# Patient Record
Sex: MALE | Race: WHITE | Employment: OTHER | URBAN - METROPOLITAN AREA
[De-identification: names, ages, dates, MRNs, and addresses within clinical notes are randomized per-mention and may not be internally consistent; named-entity substitution may affect disease eponyms.]

---

## 2023-05-24 ENCOUNTER — TRANSCRIBE ORDERS (OUTPATIENT)
Dept: ADMINISTRATIVE | Age: 68
End: 2023-05-24

## 2023-05-24 DIAGNOSIS — R31.0 GROSS HEMATURIA: Primary | ICD-10-CM

## 2023-10-02 ENCOUNTER — HOSPITAL ENCOUNTER (OUTPATIENT)
Dept: CT IMAGING | Age: 68
Discharge: HOME | End: 2023-10-02
Payer: MEDICARE

## 2023-10-02 DIAGNOSIS — C67.9 MALIGNANT NEOPLASM OF URINARY BLADDER, UNSPECIFIED SITE (HCC): ICD-10-CM

## 2023-10-02 DIAGNOSIS — N32.9 BLADDER DISORDER, UNSPECIFIED: ICD-10-CM

## 2023-10-02 DIAGNOSIS — R89.6: ICD-10-CM

## 2023-10-02 DIAGNOSIS — R31.0 GROSS HEMATURIA: ICD-10-CM

## 2023-10-02 LAB
BUN SERPL-MCNC: 15 MG/DL (ref 7–20)
CREAT SERPL-MCNC: 1 MG/DL (ref 0.8–1.3)
GFR SERPLBLD CREATININE-BSD FMLA CKD-EPI: >60 ML/MIN/{1.73_M2}

## 2023-10-02 PROCEDURE — 84520 ASSAY OF UREA NITROGEN: CPT

## 2023-10-02 PROCEDURE — 82565 ASSAY OF CREATININE: CPT

## 2023-10-02 PROCEDURE — 36415 COLL VENOUS BLD VENIPUNCTURE: CPT

## 2023-10-02 PROCEDURE — 71260 CT THORAX DX C+: CPT

## 2023-10-02 PROCEDURE — 6360000004 HC RX CONTRAST MEDICATION: Performed by: UROLOGY

## 2023-10-02 RX ADMIN — IOPAMIDOL 75 ML: 755 INJECTION, SOLUTION INTRAVENOUS at 17:16

## 2024-01-17 ENCOUNTER — HOSPITAL ENCOUNTER (OUTPATIENT)
Dept: CT IMAGING | Age: 69
Discharge: HOME | End: 2024-01-17
Attending: UROLOGY
Payer: MEDICARE

## 2024-01-17 DIAGNOSIS — C67.9 MALIGNANT NEOPLASM OF URINARY BLADDER, UNSPECIFIED SITE (HCC): ICD-10-CM

## 2024-01-17 LAB
BUN SERPL-MCNC: 19 MG/DL (ref 7–20)
CREAT SERPL-MCNC: 1.4 MG/DL (ref 0.8–1.3)
GFR SERPLBLD CREATININE-BSD FMLA CKD-EPI: 55 ML/MIN/{1.73_M2}

## 2024-01-17 PROCEDURE — 36415 COLL VENOUS BLD VENIPUNCTURE: CPT

## 2024-01-17 PROCEDURE — 82565 ASSAY OF CREATININE: CPT

## 2024-01-17 PROCEDURE — 84520 ASSAY OF UREA NITROGEN: CPT

## 2024-01-17 PROCEDURE — 6360000004 HC RX CONTRAST MEDICATION: Performed by: UROLOGY

## 2024-01-17 PROCEDURE — 74177 CT ABD & PELVIS W/CONTRAST: CPT

## 2024-01-17 RX ADMIN — IOPAMIDOL 75 ML: 755 INJECTION, SOLUTION INTRAVENOUS at 10:48

## 2024-02-20 ENCOUNTER — HOSPITAL ENCOUNTER (OUTPATIENT)
Age: 69
Discharge: HOME | End: 2024-02-20
Payer: MEDICARE

## 2024-02-20 ENCOUNTER — HOSPITAL ENCOUNTER (OUTPATIENT)
Dept: GENERAL RADIOLOGY | Age: 69
Discharge: HOME | End: 2024-02-20
Payer: MEDICARE

## 2024-02-20 DIAGNOSIS — Z01.818 PREOP TESTING: ICD-10-CM

## 2024-02-20 LAB
ABO + RH BLD: NORMAL
ACANTHOCYTES BLD QL SMEAR: ABNORMAL
ALBUMIN SERPL-MCNC: 4.3 G/DL (ref 3.4–5)
ALBUMIN/GLOB SERPL: 1.3 {RATIO} (ref 1.1–2.2)
ALP SERPL-CCNC: 102 U/L (ref 40–129)
ALT SERPL-CCNC: 18 U/L (ref 10–40)
ANION GAP SERPL CALCULATED.3IONS-SCNC: 12 MMOL/L (ref 3–16)
APTT BLD: 31 SEC (ref 22.7–35.9)
AST SERPL-CCNC: 19 U/L (ref 15–37)
BASOPHILS # BLD: 0.1 K/UL (ref 0–0.2)
BASOPHILS NFR BLD: 0.8 %
BILIRUB SERPL-MCNC: 0.3 MG/DL (ref 0–1)
BLD GP AB SCN SERPL QL: NORMAL
BUN SERPL-MCNC: 17 MG/DL (ref 7–20)
CALCIUM SERPL-MCNC: 9.4 MG/DL (ref 8.3–10.6)
CHLORIDE SERPL-SCNC: 104 MMOL/L (ref 99–110)
CO2 SERPL-SCNC: 26 MMOL/L (ref 21–32)
CREAT SERPL-MCNC: 1.6 MG/DL (ref 0.8–1.3)
DEPRECATED RDW RBC AUTO: 19.7 % (ref 12.4–15.4)
EKG ATRIAL RATE: 69 BPM
EKG DIAGNOSIS: NORMAL
EKG P AXIS: 48 DEGREES
EKG P-R INTERVAL: 148 MS
EKG Q-T INTERVAL: 372 MS
EKG QRS DURATION: 72 MS
EKG QTC CALCULATION (BAZETT): 398 MS
EKG R AXIS: -3 DEGREES
EKG T AXIS: -4 DEGREES
EKG VENTRICULAR RATE: 69 BPM
EOSINOPHIL # BLD: 0.1 K/UL (ref 0–0.6)
EOSINOPHIL NFR BLD: 1.9 %
GFR SERPLBLD CREATININE-BSD FMLA CKD-EPI: 47 ML/MIN/{1.73_M2}
GLUCOSE SERPL-MCNC: 193 MG/DL (ref 70–99)
HCT VFR BLD AUTO: 34.2 % (ref 40.5–52.5)
HGB BLD-MCNC: 10.6 G/DL (ref 13.5–17.5)
HYPOCHROMIA BLD QL SMEAR: ABNORMAL
INR PPP: 0.99 (ref 0.84–1.16)
LYMPHOCYTES # BLD: 1.6 K/UL (ref 1–5.1)
LYMPHOCYTES NFR BLD: 22.9 %
MCH RBC QN AUTO: 20 PG (ref 26–34)
MCHC RBC AUTO-ENTMCNC: 31 G/DL (ref 31–36)
MCV RBC AUTO: 64.3 FL (ref 80–100)
MICROCYTES BLD QL SMEAR: ABNORMAL
MONOCYTES # BLD: 0.5 K/UL (ref 0–1.3)
MONOCYTES NFR BLD: 6.9 %
NEUTROPHILS # BLD: 4.6 K/UL (ref 1.7–7.7)
NEUTROPHILS NFR BLD: 67.5 %
OVALOCYTES BLD QL SMEAR: ABNORMAL
PATH INTERP BLD-IMP: YES
PLATELET # BLD AUTO: 377 K/UL (ref 135–450)
PLATELET BLD QL SMEAR: ADEQUATE
PMV BLD AUTO: 8.4 FL (ref 5–10.5)
POIKILOCYTOSIS BLD QL SMEAR: ABNORMAL
POTASSIUM SERPL-SCNC: 4 MMOL/L (ref 3.5–5.1)
PROT SERPL-MCNC: 7.6 G/DL (ref 6.4–8.2)
PROTHROMBIN TIME: 13.1 SEC (ref 11.5–14.8)
RBC # BLD AUTO: 5.31 M/UL (ref 4.2–5.9)
SLIDE REVIEW: ABNORMAL
SODIUM SERPL-SCNC: 142 MMOL/L (ref 136–145)
WBC # BLD AUTO: 6.9 K/UL (ref 4–11)

## 2024-02-20 PROCEDURE — 80053 COMPREHEN METABOLIC PANEL: CPT

## 2024-02-20 PROCEDURE — 86850 RBC ANTIBODY SCREEN: CPT

## 2024-02-20 PROCEDURE — 85730 THROMBOPLASTIN TIME PARTIAL: CPT

## 2024-02-20 PROCEDURE — 86901 BLOOD TYPING SEROLOGIC RH(D): CPT

## 2024-02-20 PROCEDURE — 36415 COLL VENOUS BLD VENIPUNCTURE: CPT

## 2024-02-20 PROCEDURE — 85025 COMPLETE CBC W/AUTO DIFF WBC: CPT

## 2024-02-20 PROCEDURE — 71046 X-RAY EXAM CHEST 2 VIEWS: CPT

## 2024-02-20 PROCEDURE — 85610 PROTHROMBIN TIME: CPT

## 2024-02-20 PROCEDURE — 86900 BLOOD TYPING SEROLOGIC ABO: CPT

## 2024-02-20 PROCEDURE — 93005 ELECTROCARDIOGRAM TRACING: CPT | Performed by: UROLOGY

## 2024-02-21 LAB — PATH INTERP BLD-IMP: NORMAL

## 2024-03-06 NOTE — PATIENT INSTRUCTIONS
[facility]  [address]   [place], [place] [address]  Telephone: [phone]     FAX [phone]    Discharge Instructions    Important reminders:    **If you have any signs and symptoms of illness (Cough, fever, congestion, nausea, vomiting, diarrhea, etc.) please call the wound care center prior to your appointment.    1. Increase Protein intake for optimal wound healing  2. No added salt to reduce any swelling  3. If diabetic, maintain good glucose control  4. If you smoke, smoking prohibits wound healing, we ask that you refrain from smoking.  5. When taking antibiotics take the entire prescription as ordered. Do not stop taking until medication is all gone unless otherwise instructed.   6. Exercise as tolerated.   7. Keep weight off wounds and reposition every 2 hours if applicable.  8. If wound(s) is on your lower extremity, elevate legs to the level of the heart or above for 30 minutes 4-5 times a day and/or when sitting. Avoid standing for long periods of time.   9. Do not get wounds wet in bath or shower unless otherwise instructed by your physician. If your wound is on your foot or leg, you may purchase a cast bag. Please ask at the pharmacy.      If Vascular testing is ordered, please call [address] [place] [address] [phone]) to schedule.    Vascular tests ordered by Wound Care Physicians may take up to 2 hours to complete. Please keep that in mind when scheduling.     If Vascular testing is scheduled, please bring supplies to replace your dressing after testing is done. The vascular department does not stock supplies.     Wound: Abdomen / Marking for surgery    With each dressing change, rinse wounds with 0.9% Saline. (May use wound wash or soft contact solution. Both can be purchased at a local drug store). If unable to obtain saline, may use a gentle soap and water.    Dressing care: May take Shower, keep it covered so marking doesn't come off.   Tips for after surgery-Drink plenty of fluids.

## 2024-03-11 ENCOUNTER — HOSPITAL ENCOUNTER (OUTPATIENT)
Dept: WOUND CARE | Age: 69
Discharge: HOME | End: 2024-03-11
Attending: NURSE PRACTITIONER
Payer: MEDICARE

## 2024-03-11 VITALS
HEART RATE: 81 BPM | WEIGHT: 178.2 LBS | DIASTOLIC BLOOD PRESSURE: 85 MMHG | HEIGHT: 69 IN | SYSTOLIC BLOOD PRESSURE: 146 MMHG | TEMPERATURE: 96.6 F | BODY MASS INDEX: 26.39 KG/M2 | RESPIRATION RATE: 15 BRPM

## 2024-03-11 PROBLEM — Z01.818 ENCOUNTER FOR PREOPERATIVE ASSESSMENT: Status: ACTIVE | Noted: 2024-03-11

## 2024-03-11 PROCEDURE — 99202 OFFICE O/P NEW SF 15 MIN: CPT | Performed by: NURSE PRACTITIONER

## 2024-03-11 PROCEDURE — 99212 OFFICE O/P EST SF 10 MIN: CPT

## 2024-03-11 NOTE — PLAN OF CARE
Discharge instructions given.  Patient verbalized understanding.  Return to [facility] in 2 week(s) after surgery  Marking

## 2024-03-11 NOTE — PROGRESS NOTES
[facility]  [address] [address]  Telephone: [phone]      NAME:  Jay Ruelas  MEDICAL RECORD NUMBER:  [id number]  AGE: 68 y.o.   GENDER:  male  :  1955  TODAY'S DATE:  3/11/2024    Subjective       Chief Complaint   Patient presents with    Wound Check     Ostomy Marking         HISTORY of PRESENT ILLNESS HPI     Jay Ruelas is a 68 y.o. male New patient referred by Glenna, who presents today for urostomy stoma marking in prep for 3/13/2024 surgery with Dr. Manzanares   Discussed general concerns with upcoming surgery, and specific post-op expectations he can expect.  Showed pt a 1 piece and 2-piece pouching system and what to expect (2 piece after surgery).  Evaluated pts abdomen for a good site in lying, sitting and standing position, away from belt line and umbilicus and within rectus muscle, no abdominal scars.  Covered marked selected site with Tegaderm and suggested he make an appointment for follow-up in 2 weeks.  Discussed stents and general nutrition and fluid needs post-op.  He lives alone and has nearby brothers who have offered assistance as needed. Denies constitutional issus    PAST MEDICAL HISTORY        Diagnosis Date    Cancer (HCC)        PAST SURGICAL HISTORY    Past Surgical History:   Procedure Laterality Date    TONSILLECTOMY      WISDOM TOOTH EXTRACTION         FAMILY HISTORY    Family History   Problem Relation Age of Onset    Cancer Unknown        SOCIAL HISTORY    Social History     Tobacco Use    Smoking status: Never    Smokeless tobacco: Never   Vaping Use    Vaping Use: Never used   Substance Use Topics    Alcohol use: Yes     Comment: soc    Drug use: No       ALLERGIES    No Known Allergies    MEDICATIONS    No current outpatient medications on file prior to encounter.     No current facility-administered medications on file prior to encounter.       REVIEW OF SYSTEMS    Pertinent items are noted in

## 2024-03-13 ENCOUNTER — ANESTHESIA EVENT (OUTPATIENT)
Dept: OPERATING ROOM | Age: 69
End: 2024-03-13
Payer: MEDICARE

## 2024-03-14 ENCOUNTER — HOSPITAL ENCOUNTER (INPATIENT)
Age: 69
LOS: 5 days | Discharge: HOME | DRG: 654 | End: 2024-03-19
Attending: UROLOGY | Admitting: UROLOGY
Payer: MEDICARE

## 2024-03-14 ENCOUNTER — ANESTHESIA (OUTPATIENT)
Dept: OPERATING ROOM | Age: 69
End: 2024-03-14
Payer: MEDICARE

## 2024-03-14 DIAGNOSIS — C67.9 MALIGNANT NEOPLASM OF BLADDER WALL (HCC): ICD-10-CM

## 2024-03-14 DIAGNOSIS — Z01.818 PREOP TESTING: Primary | ICD-10-CM

## 2024-03-14 LAB
ABO + RH BLD: NORMAL
BLD GP AB SCN SERPL QL: NORMAL

## 2024-03-14 PROCEDURE — 6370000000 HC RX 637 (ALT 250 FOR IP): Performed by: UROLOGY

## 2024-03-14 PROCEDURE — 2720000010 HC SURG SUPPLY STERILE: Performed by: UROLOGY

## 2024-03-14 PROCEDURE — 6360000002 HC RX W HCPCS: Performed by: NURSE ANESTHETIST, CERTIFIED REGISTERED

## 2024-03-14 PROCEDURE — 6360000002 HC RX W HCPCS: Performed by: UROLOGY

## 2024-03-14 PROCEDURE — 88307 TISSUE EXAM BY PATHOLOGIST: CPT

## 2024-03-14 PROCEDURE — 3700000000 HC ANESTHESIA ATTENDED CARE: Performed by: UROLOGY

## 2024-03-14 PROCEDURE — 2580000003 HC RX 258: Performed by: UROLOGY

## 2024-03-14 PROCEDURE — 2580000003 HC RX 258: Performed by: NURSE ANESTHETIST, CERTIFIED REGISTERED

## 2024-03-14 PROCEDURE — 86900 BLOOD TYPING SEROLOGIC ABO: CPT

## 2024-03-14 PROCEDURE — P9045 ALBUMIN (HUMAN), 5%, 250 ML: HCPCS | Performed by: NURSE ANESTHETIST, CERTIFIED REGISTERED

## 2024-03-14 PROCEDURE — 86901 BLOOD TYPING SEROLOGIC RH(D): CPT

## 2024-03-14 PROCEDURE — 0TB64ZX EXCISION OF RIGHT URETER, PERCUTANEOUS ENDOSCOPIC APPROACH, DIAGNOSTIC: ICD-10-PCS | Performed by: UROLOGY

## 2024-03-14 PROCEDURE — 8E0W4CZ ROBOTIC ASSISTED PROCEDURE OF TRUNK REGION, PERCUTANEOUS ENDOSCOPIC APPROACH: ICD-10-PCS | Performed by: UROLOGY

## 2024-03-14 PROCEDURE — 88360 TUMOR IMMUNOHISTOCHEM/MANUAL: CPT

## 2024-03-14 PROCEDURE — 0T184ZC BYPASS BILATERAL URETERS TO ILEOCUTANEOUS, PERCUTANEOUS ENDOSCOPIC APPROACH: ICD-10-PCS | Performed by: UROLOGY

## 2024-03-14 PROCEDURE — 7100000000 HC PACU RECOVERY - FIRST 15 MIN: Performed by: UROLOGY

## 2024-03-14 PROCEDURE — 1200000000 HC SEMI PRIVATE

## 2024-03-14 PROCEDURE — 0TTB4ZZ RESECTION OF BLADDER, PERCUTANEOUS ENDOSCOPIC APPROACH: ICD-10-PCS | Performed by: UROLOGY

## 2024-03-14 PROCEDURE — C1889 IMPLANT/INSERT DEVICE, NOC: HCPCS | Performed by: UROLOGY

## 2024-03-14 PROCEDURE — 6360000002 HC RX W HCPCS

## 2024-03-14 PROCEDURE — 2709999900 HC NON-CHARGEABLE SUPPLY: Performed by: UROLOGY

## 2024-03-14 PROCEDURE — 2500000003 HC RX 250 WO HCPCS: Performed by: NURSE ANESTHETIST, CERTIFIED REGISTERED

## 2024-03-14 PROCEDURE — 88331 PATH CONSLTJ SURG 1 BLK 1SPC: CPT

## 2024-03-14 PROCEDURE — C9290 INJ, BUPIVACAINE LIPOSOME: HCPCS | Performed by: UROLOGY

## 2024-03-14 PROCEDURE — 3600000019 HC SURGERY ROBOT ADDTL 15MIN: Performed by: UROLOGY

## 2024-03-14 PROCEDURE — 0VT04ZZ RESECTION OF PROSTATE, PERCUTANEOUS ENDOSCOPIC APPROACH: ICD-10-PCS | Performed by: UROLOGY

## 2024-03-14 PROCEDURE — C2617 STENT, NON-COR, TEM W/O DEL: HCPCS | Performed by: UROLOGY

## 2024-03-14 PROCEDURE — 36415 COLL VENOUS BLD VENIPUNCTURE: CPT

## 2024-03-14 PROCEDURE — 94150 VITAL CAPACITY TEST: CPT

## 2024-03-14 PROCEDURE — 3700000001 HC ADD 15 MINUTES (ANESTHESIA): Performed by: UROLOGY

## 2024-03-14 PROCEDURE — 3600000009 HC SURGERY ROBOT BASE: Performed by: UROLOGY

## 2024-03-14 PROCEDURE — C1769 GUIDE WIRE: HCPCS | Performed by: UROLOGY

## 2024-03-14 PROCEDURE — 6370000000 HC RX 637 (ALT 250 FOR IP)

## 2024-03-14 PROCEDURE — 88309 TISSUE EXAM BY PATHOLOGIST: CPT

## 2024-03-14 PROCEDURE — 07BC4ZZ EXCISION OF PELVIS LYMPHATIC, PERCUTANEOUS ENDOSCOPIC APPROACH: ICD-10-PCS | Performed by: UROLOGY

## 2024-03-14 PROCEDURE — 86850 RBC ANTIBODY SCREEN: CPT

## 2024-03-14 PROCEDURE — S2900 ROBOTIC SURGICAL SYSTEM: HCPCS | Performed by: UROLOGY

## 2024-03-14 PROCEDURE — 88342 IMHCHEM/IMCYTCHM 1ST ANTB: CPT

## 2024-03-14 PROCEDURE — 88305 TISSUE EXAM BY PATHOLOGIST: CPT

## 2024-03-14 PROCEDURE — 0TB74ZZ EXCISION OF LEFT URETER, PERCUTANEOUS ENDOSCOPIC APPROACH: ICD-10-PCS | Performed by: UROLOGY

## 2024-03-14 PROCEDURE — 7100000001 HC PACU RECOVERY - ADDTL 15 MIN: Performed by: UROLOGY

## 2024-03-14 DEVICE — URINARY DIVERSION STENT SET
Type: IMPLANTABLE DEVICE | Status: FUNCTIONAL
Brand: PERCUFLEX™ URINARY DIVERSION STENT SET

## 2024-03-14 DEVICE — CLIP INT M L POLYMER LOK LIG HEM O LOK: Type: IMPLANTABLE DEVICE | Status: FUNCTIONAL

## 2024-03-14 DEVICE — CLIP INT L POLYMER LOK LIG HEM O LOK (6EA/PK): Type: IMPLANTABLE DEVICE | Status: FUNCTIONAL

## 2024-03-14 RX ORDER — BUPIVACAINE HYDROCHLORIDE 5 MG/ML
INJECTION, SOLUTION EPIDURAL; INTRACAUDAL
Status: COMPLETED | OUTPATIENT
Start: 2024-03-14 | End: 2024-03-14

## 2024-03-14 RX ORDER — OXYCODONE HYDROCHLORIDE 5 MG/1
5 TABLET ORAL EVERY 4 HOURS PRN
Status: DISCONTINUED | OUTPATIENT
Start: 2024-03-14 | End: 2024-03-19 | Stop reason: HOSPADM

## 2024-03-14 RX ORDER — NALOXONE HYDROCHLORIDE 0.4 MG/ML
INJECTION, SOLUTION INTRAMUSCULAR; INTRAVENOUS; SUBCUTANEOUS PRN
Status: DISCONTINUED | OUTPATIENT
Start: 2024-03-14 | End: 2024-03-14 | Stop reason: HOSPADM

## 2024-03-14 RX ORDER — ALBUMIN, HUMAN INJ 5% 5 %
SOLUTION INTRAVENOUS PRN
Status: DISCONTINUED | OUTPATIENT
Start: 2024-03-14 | End: 2024-03-14 | Stop reason: SDUPTHER

## 2024-03-14 RX ORDER — HYDROMORPHONE HYDROCHLORIDE 2 MG/ML
0.5 INJECTION, SOLUTION INTRAMUSCULAR; INTRAVENOUS; SUBCUTANEOUS EVERY 5 MIN PRN
Status: DISCONTINUED | OUTPATIENT
Start: 2024-03-14 | End: 2024-03-14 | Stop reason: HOSPADM

## 2024-03-14 RX ORDER — CEFAZOLIN SODIUM 1 G/3ML
INJECTION, POWDER, FOR SOLUTION INTRAMUSCULAR; INTRAVENOUS PRN
Status: DISCONTINUED | OUTPATIENT
Start: 2024-03-14 | End: 2024-03-14 | Stop reason: SDUPTHER

## 2024-03-14 RX ORDER — CEFAZOLIN 2 G/1
INJECTION, POWDER, FOR SOLUTION INTRAMUSCULAR; INTRAVENOUS
Status: COMPLETED
Start: 2024-03-14 | End: 2024-03-14

## 2024-03-14 RX ORDER — SODIUM CHLORIDE 0.9 % (FLUSH) 0.9 %
5-40 SYRINGE (ML) INJECTION PRN
Status: DISCONTINUED | OUTPATIENT
Start: 2024-03-14 | End: 2024-03-14 | Stop reason: HOSPADM

## 2024-03-14 RX ORDER — SODIUM CHLORIDE 9 MG/ML
INJECTION, SOLUTION INTRAVENOUS PRN
Status: DISCONTINUED | OUTPATIENT
Start: 2024-03-14 | End: 2024-03-14 | Stop reason: HOSPADM

## 2024-03-14 RX ORDER — LIDOCAINE HYDROCHLORIDE 20 MG/ML
INJECTION, SOLUTION EPIDURAL; INFILTRATION; INTRACAUDAL; PERINEURAL PRN
Status: DISCONTINUED | OUTPATIENT
Start: 2024-03-14 | End: 2024-03-14 | Stop reason: SDUPTHER

## 2024-03-14 RX ORDER — SODIUM CHLORIDE, SODIUM LACTATE, POTASSIUM CHLORIDE, CALCIUM CHLORIDE 600; 310; 30; 20 MG/100ML; MG/100ML; MG/100ML; MG/100ML
INJECTION, SOLUTION INTRAVENOUS CONTINUOUS
Status: DISCONTINUED | OUTPATIENT
Start: 2024-03-14 | End: 2024-03-14 | Stop reason: HOSPADM

## 2024-03-14 RX ORDER — SODIUM CHLORIDE, SODIUM LACTATE, POTASSIUM CHLORIDE, CALCIUM CHLORIDE 600; 310; 30; 20 MG/100ML; MG/100ML; MG/100ML; MG/100ML
INJECTION, SOLUTION INTRAVENOUS CONTINUOUS
Status: DISCONTINUED | OUTPATIENT
Start: 2024-03-14 | End: 2024-03-19 | Stop reason: HOSPADM

## 2024-03-14 RX ORDER — SODIUM CHLORIDE 9 MG/ML
INJECTION, SOLUTION INTRAVENOUS PRN
Status: DISCONTINUED | OUTPATIENT
Start: 2024-03-14 | End: 2024-03-19 | Stop reason: HOSPADM

## 2024-03-14 RX ORDER — GLYCOPYRROLATE 0.2 MG/ML
INJECTION INTRAMUSCULAR; INTRAVENOUS PRN
Status: DISCONTINUED | OUTPATIENT
Start: 2024-03-14 | End: 2024-03-14 | Stop reason: SDUPTHER

## 2024-03-14 RX ORDER — PROPOFOL 10 MG/ML
INJECTION, EMULSION INTRAVENOUS PRN
Status: DISCONTINUED | OUTPATIENT
Start: 2024-03-14 | End: 2024-03-14 | Stop reason: SDUPTHER

## 2024-03-14 RX ORDER — ACETAMINOPHEN 325 MG/1
TABLET ORAL
Status: COMPLETED
Start: 2024-03-14 | End: 2024-03-14

## 2024-03-14 RX ORDER — MAGNESIUM SULFATE HEPTAHYDRATE 500 MG/ML
INJECTION, SOLUTION INTRAMUSCULAR; INTRAVENOUS PRN
Status: DISCONTINUED | OUTPATIENT
Start: 2024-03-14 | End: 2024-03-14 | Stop reason: SDUPTHER

## 2024-03-14 RX ORDER — SODIUM CHLORIDE, SODIUM LACTATE, POTASSIUM CHLORIDE, CALCIUM CHLORIDE 600; 310; 30; 20 MG/100ML; MG/100ML; MG/100ML; MG/100ML
INJECTION, SOLUTION INTRAVENOUS CONTINUOUS PRN
Status: DISCONTINUED | OUTPATIENT
Start: 2024-03-14 | End: 2024-03-14 | Stop reason: SDUPTHER

## 2024-03-14 RX ORDER — LIDOCAINE HYDROCHLORIDE 10 MG/ML
0.5 INJECTION, SOLUTION EPIDURAL; INFILTRATION; INTRACAUDAL; PERINEURAL ONCE
Status: DISCONTINUED | OUTPATIENT
Start: 2024-03-14 | End: 2024-03-14 | Stop reason: HOSPADM

## 2024-03-14 RX ORDER — SODIUM CHLORIDE 0.9 % (FLUSH) 0.9 %
5-40 SYRINGE (ML) INJECTION EVERY 12 HOURS SCHEDULED
Status: DISCONTINUED | OUTPATIENT
Start: 2024-03-14 | End: 2024-03-14 | Stop reason: HOSPADM

## 2024-03-14 RX ORDER — DEXMEDETOMIDINE HYDROCHLORIDE 100 UG/ML
INJECTION, SOLUTION INTRAVENOUS PRN
Status: DISCONTINUED | OUTPATIENT
Start: 2024-03-14 | End: 2024-03-14 | Stop reason: SDUPTHER

## 2024-03-14 RX ORDER — SODIUM CHLORIDE 0.9 % (FLUSH) 0.9 %
5-40 SYRINGE (ML) INJECTION PRN
Status: DISCONTINUED | OUTPATIENT
Start: 2024-03-14 | End: 2024-03-19 | Stop reason: HOSPADM

## 2024-03-14 RX ORDER — ONDANSETRON 2 MG/ML
4 INJECTION INTRAMUSCULAR; INTRAVENOUS
Status: DISCONTINUED | OUTPATIENT
Start: 2024-03-14 | End: 2024-03-14 | Stop reason: HOSPADM

## 2024-03-14 RX ORDER — SENNA AND DOCUSATE SODIUM 50; 8.6 MG/1; MG/1
1 TABLET, FILM COATED ORAL 2 TIMES DAILY
Status: DISCONTINUED | OUTPATIENT
Start: 2024-03-14 | End: 2024-03-19 | Stop reason: HOSPADM

## 2024-03-14 RX ORDER — DEXAMETHASONE SODIUM PHOSPHATE 4 MG/ML
INJECTION, SOLUTION INTRA-ARTICULAR; INTRALESIONAL; INTRAMUSCULAR; INTRAVENOUS; SOFT TISSUE PRN
Status: DISCONTINUED | OUTPATIENT
Start: 2024-03-14 | End: 2024-03-14 | Stop reason: SDUPTHER

## 2024-03-14 RX ORDER — FENTANYL CITRATE 50 UG/ML
50 INJECTION, SOLUTION INTRAMUSCULAR; INTRAVENOUS EVERY 5 MIN PRN
Status: DISCONTINUED | OUTPATIENT
Start: 2024-03-14 | End: 2024-03-14 | Stop reason: HOSPADM

## 2024-03-14 RX ORDER — APREPITANT 40 MG/1
CAPSULE ORAL
Status: COMPLETED
Start: 2024-03-14 | End: 2024-03-14

## 2024-03-14 RX ORDER — ONDANSETRON 2 MG/ML
4 INJECTION INTRAMUSCULAR; INTRAVENOUS EVERY 6 HOURS PRN
Status: DISCONTINUED | OUTPATIENT
Start: 2024-03-14 | End: 2024-03-19 | Stop reason: HOSPADM

## 2024-03-14 RX ORDER — ACETAMINOPHEN 325 MG/1
650 TABLET ORAL EVERY 6 HOURS
Status: DISCONTINUED | OUTPATIENT
Start: 2024-03-14 | End: 2024-03-19 | Stop reason: HOSPADM

## 2024-03-14 RX ORDER — AMOXICILLIN 250 MG
1 CAPSULE ORAL 2 TIMES DAILY
Qty: 30 TABLET | Refills: 1 | Status: SHIPPED | OUTPATIENT
Start: 2024-03-14 | End: 2024-04-13

## 2024-03-14 RX ORDER — HEPARIN SODIUM 5000 [USP'U]/ML
5000 INJECTION, SOLUTION INTRAVENOUS; SUBCUTANEOUS EVERY 8 HOURS SCHEDULED
Status: DISCONTINUED | OUTPATIENT
Start: 2024-03-14 | End: 2024-03-15

## 2024-03-14 RX ORDER — PHENYLEPHRINE HCL IN 0.9% NACL 1 MG/10 ML
SYRINGE (ML) INTRAVENOUS PRN
Status: DISCONTINUED | OUTPATIENT
Start: 2024-03-14 | End: 2024-03-14 | Stop reason: SDUPTHER

## 2024-03-14 RX ORDER — MAGNESIUM HYDROXIDE 1200 MG/15ML
LIQUID ORAL
Status: COMPLETED | OUTPATIENT
Start: 2024-03-14 | End: 2024-03-14

## 2024-03-14 RX ORDER — ONDANSETRON 2 MG/ML
INJECTION INTRAMUSCULAR; INTRAVENOUS PRN
Status: DISCONTINUED | OUTPATIENT
Start: 2024-03-14 | End: 2024-03-14 | Stop reason: SDUPTHER

## 2024-03-14 RX ORDER — SODIUM CHLORIDE 0.9 % (FLUSH) 0.9 %
5-40 SYRINGE (ML) INJECTION EVERY 12 HOURS SCHEDULED
Status: DISCONTINUED | OUTPATIENT
Start: 2024-03-14 | End: 2024-03-19 | Stop reason: HOSPADM

## 2024-03-14 RX ORDER — SUCCINYLCHOLINE/SOD CL,ISO/PF 200MG/10ML
SYRINGE (ML) INTRAVENOUS PRN
Status: DISCONTINUED | OUTPATIENT
Start: 2024-03-14 | End: 2024-03-14 | Stop reason: SDUPTHER

## 2024-03-14 RX ORDER — MIDAZOLAM HYDROCHLORIDE 1 MG/ML
INJECTION INTRAMUSCULAR; INTRAVENOUS PRN
Status: DISCONTINUED | OUTPATIENT
Start: 2024-03-14 | End: 2024-03-14 | Stop reason: SDUPTHER

## 2024-03-14 RX ORDER — EPHEDRINE SULFATE 50 MG/ML
INJECTION INTRAVENOUS PRN
Status: DISCONTINUED | OUTPATIENT
Start: 2024-03-14 | End: 2024-03-14 | Stop reason: SDUPTHER

## 2024-03-14 RX ORDER — HYDROMORPHONE HYDROCHLORIDE 1 MG/ML
0.5 INJECTION, SOLUTION INTRAMUSCULAR; INTRAVENOUS; SUBCUTANEOUS EVERY 4 HOURS PRN
Status: DISCONTINUED | OUTPATIENT
Start: 2024-03-14 | End: 2024-03-19 | Stop reason: HOSPADM

## 2024-03-14 RX ORDER — HYDROCODONE BITARTRATE AND ACETAMINOPHEN 5; 325 MG/1; MG/1
1 TABLET ORAL EVERY 6 HOURS PRN
Qty: 20 TABLET | Refills: 0 | Status: SHIPPED | OUTPATIENT
Start: 2024-03-14 | End: 2024-03-19

## 2024-03-14 RX ORDER — KETAMINE HCL IN NACL, ISO-OSM 100MG/10ML
SYRINGE (ML) INJECTION PRN
Status: DISCONTINUED | OUTPATIENT
Start: 2024-03-14 | End: 2024-03-14 | Stop reason: SDUPTHER

## 2024-03-14 RX ORDER — APREPITANT 40 MG/1
40 CAPSULE ORAL ONCE
Status: COMPLETED | OUTPATIENT
Start: 2024-03-14 | End: 2024-03-14

## 2024-03-14 RX ORDER — OXYCODONE HYDROCHLORIDE 5 MG/1
5 TABLET ORAL
Status: DISCONTINUED | OUTPATIENT
Start: 2024-03-14 | End: 2024-03-14 | Stop reason: HOSPADM

## 2024-03-14 RX ORDER — VECURONIUM BROMIDE 1 MG/ML
INJECTION, POWDER, LYOPHILIZED, FOR SOLUTION INTRAVENOUS PRN
Status: DISCONTINUED | OUTPATIENT
Start: 2024-03-14 | End: 2024-03-14 | Stop reason: SDUPTHER

## 2024-03-14 RX ORDER — ACETAMINOPHEN 325 MG/1
650 TABLET ORAL ONCE
Status: COMPLETED | OUTPATIENT
Start: 2024-03-14 | End: 2024-03-14

## 2024-03-14 RX ORDER — HYDROMORPHONE HYDROCHLORIDE 1 MG/ML
0.25 INJECTION, SOLUTION INTRAMUSCULAR; INTRAVENOUS; SUBCUTANEOUS
Status: DISCONTINUED | OUTPATIENT
Start: 2024-03-14 | End: 2024-03-19 | Stop reason: HOSPADM

## 2024-03-14 RX ORDER — FENTANYL CITRATE 50 UG/ML
INJECTION, SOLUTION INTRAMUSCULAR; INTRAVENOUS PRN
Status: DISCONTINUED | OUTPATIENT
Start: 2024-03-14 | End: 2024-03-14 | Stop reason: SDUPTHER

## 2024-03-14 RX ADMIN — Medication 100 MCG: at 15:00

## 2024-03-14 RX ADMIN — SODIUM CHLORIDE, POTASSIUM CHLORIDE, SODIUM LACTATE AND CALCIUM CHLORIDE: 600; 310; 30; 20 INJECTION, SOLUTION INTRAVENOUS at 16:44

## 2024-03-14 RX ADMIN — GLYCOPYRROLATE 0.2 MG: 0.2 INJECTION INTRAMUSCULAR; INTRAVENOUS at 08:10

## 2024-03-14 RX ADMIN — DEXMEDETOMIDINE HYDROCHLORIDE 10 MCG: 100 INJECTION, SOLUTION INTRAVENOUS at 08:23

## 2024-03-14 RX ADMIN — APREPITANT 40 MG: 40 CAPSULE ORAL at 07:05

## 2024-03-14 RX ADMIN — MIDAZOLAM 2 MG: 1 INJECTION INTRAMUSCULAR; INTRAVENOUS at 07:35

## 2024-03-14 RX ADMIN — CEFAZOLIN 2 G: 1 INJECTION, POWDER, FOR SOLUTION INTRAVENOUS at 15:39

## 2024-03-14 RX ADMIN — ONDANSETRON 4 MG: 2 INJECTION INTRAMUSCULAR; INTRAVENOUS at 15:02

## 2024-03-14 RX ADMIN — LIDOCAINE HYDROCHLORIDE 100 MG: 20 INJECTION, SOLUTION EPIDURAL; INFILTRATION; INTRACAUDAL; PERINEURAL at 07:52

## 2024-03-14 RX ADMIN — SODIUM CHLORIDE, POTASSIUM CHLORIDE, SODIUM LACTATE AND CALCIUM CHLORIDE: 600; 310; 30; 20 INJECTION, SOLUTION INTRAVENOUS at 07:06

## 2024-03-14 RX ADMIN — Medication 25 MG: at 09:06

## 2024-03-14 RX ADMIN — SODIUM CHLORIDE, PRESERVATIVE FREE 10 ML: 5 INJECTION INTRAVENOUS at 21:44

## 2024-03-14 RX ADMIN — Medication 170 MG: at 07:52

## 2024-03-14 RX ADMIN — SODIUM CHLORIDE, SODIUM LACTATE, POTASSIUM CHLORIDE, CALCIUM CHLORIDE: 600; 310; 30; 20 INJECTION, SOLUTION INTRAVENOUS at 07:46

## 2024-03-14 RX ADMIN — CEFAZOLIN 2000 MG: 2 INJECTION, POWDER, FOR SOLUTION INTRAMUSCULAR; INTRAVENOUS at 07:42

## 2024-03-14 RX ADMIN — VECURONIUM BROMIDE 10 MG: 1 INJECTION, POWDER, LYOPHILIZED, FOR SOLUTION INTRAVENOUS at 12:44

## 2024-03-14 RX ADMIN — VECURONIUM BROMIDE 10 MG: 1 INJECTION, POWDER, LYOPHILIZED, FOR SOLUTION INTRAVENOUS at 08:05

## 2024-03-14 RX ADMIN — SUGAMMADEX 200 MG: 100 INJECTION, SOLUTION INTRAVENOUS at 14:54

## 2024-03-14 RX ADMIN — EPHEDRINE SULFATE 10 MG: 50 INJECTION, SOLUTION INTRAVENOUS at 08:17

## 2024-03-14 RX ADMIN — Medication 10 MG: at 14:31

## 2024-03-14 RX ADMIN — VECURONIUM BROMIDE 10 MG: 1 INJECTION, POWDER, LYOPHILIZED, FOR SOLUTION INTRAVENOUS at 09:39

## 2024-03-14 RX ADMIN — Medication 200 MCG: at 08:10

## 2024-03-14 RX ADMIN — SUGAMMADEX 100 MG: 100 INJECTION, SOLUTION INTRAVENOUS at 15:00

## 2024-03-14 RX ADMIN — GLYCOPYRROLATE 0.2 MG: 0.2 INJECTION INTRAMUSCULAR; INTRAVENOUS at 08:00

## 2024-03-14 RX ADMIN — FENTANYL CITRATE 50 MCG: 50 INJECTION, SOLUTION INTRAMUSCULAR; INTRAVENOUS at 07:52

## 2024-03-14 RX ADMIN — DEXMEDETOMIDINE HYDROCHLORIDE 10 MCG: 100 INJECTION, SOLUTION INTRAVENOUS at 09:40

## 2024-03-14 RX ADMIN — ACETAMINOPHEN 650 MG: 325 TABLET ORAL at 07:05

## 2024-03-14 RX ADMIN — EPHEDRINE SULFATE 10 MG: 50 INJECTION, SOLUTION INTRAVENOUS at 13:24

## 2024-03-14 RX ADMIN — SODIUM CHLORIDE, POTASSIUM CHLORIDE, SODIUM LACTATE AND CALCIUM CHLORIDE: 600; 310; 30; 20 INJECTION, SOLUTION INTRAVENOUS at 08:32

## 2024-03-14 RX ADMIN — EPHEDRINE SULFATE 10 MG: 50 INJECTION, SOLUTION INTRAVENOUS at 09:32

## 2024-03-14 RX ADMIN — DEXAMETHASONE SODIUM PHOSPHATE 8 MG: 4 INJECTION, SOLUTION INTRAMUSCULAR; INTRAVENOUS at 08:10

## 2024-03-14 RX ADMIN — FENTANYL CITRATE 50 MCG: 50 INJECTION, SOLUTION INTRAMUSCULAR; INTRAVENOUS at 14:31

## 2024-03-14 RX ADMIN — PROPOFOL 200 MG: 10 INJECTION, EMULSION INTRAVENOUS at 07:52

## 2024-03-14 RX ADMIN — HEPARIN SODIUM 5000 UNITS: 5000 INJECTION INTRAVENOUS; SUBCUTANEOUS at 21:44

## 2024-03-14 RX ADMIN — SODIUM CHLORIDE, POTASSIUM CHLORIDE, SODIUM LACTATE AND CALCIUM CHLORIDE: 600; 310; 30; 20 INJECTION, SOLUTION INTRAVENOUS at 07:46

## 2024-03-14 RX ADMIN — ALBUMIN (HUMAN) 12.5 G: 12.5 INJECTION, SOLUTION INTRAVENOUS at 10:23

## 2024-03-14 RX ADMIN — EPHEDRINE SULFATE 10 MG: 50 INJECTION, SOLUTION INTRAVENOUS at 12:56

## 2024-03-14 RX ADMIN — DEXMEDETOMIDINE HYDROCHLORIDE 8 MCG: 100 INJECTION, SOLUTION INTRAVENOUS at 15:16

## 2024-03-14 RX ADMIN — ONDANSETRON 4 MG: 2 INJECTION INTRAMUSCULAR; INTRAVENOUS at 08:10

## 2024-03-14 RX ADMIN — MAGNESIUM SULFATE HEPTAHYDRATE 1 G: 500 INJECTION, SOLUTION INTRAMUSCULAR; INTRAVENOUS at 08:23

## 2024-03-14 RX ADMIN — Medication 100 MCG: at 14:49

## 2024-03-14 RX ADMIN — Medication 15 MG: at 13:27

## 2024-03-14 RX ADMIN — CEFAZOLIN 2 G: 1 INJECTION, POWDER, FOR SOLUTION INTRAVENOUS at 11:30

## 2024-03-14 RX ADMIN — EPHEDRINE SULFATE 10 MG: 50 INJECTION, SOLUTION INTRAVENOUS at 10:19

## 2024-03-14 RX ADMIN — NALOXEGOL OXALATE 12.5 MG: 25 TABLET, FILM COATED ORAL at 18:05

## 2024-03-14 RX ADMIN — SODIUM CHLORIDE, SODIUM LACTATE, POTASSIUM CHLORIDE, CALCIUM CHLORIDE: 600; 310; 30; 20 INJECTION, SOLUTION INTRAVENOUS at 14:29

## 2024-03-14 RX ADMIN — ACETAMINOPHEN 650 MG: 325 TABLET ORAL at 18:05

## 2024-03-14 RX ADMIN — DOCUSATE SODIUM 50MG AND SENNOSIDES 8.6MG 1 TABLET: 8.6; 5 TABLET, FILM COATED ORAL at 21:44

## 2024-03-14 RX ADMIN — Medication 200 MCG: at 08:00

## 2024-03-14 RX ADMIN — PROPOFOL 50 MG: 10 INJECTION, EMULSION INTRAVENOUS at 08:24

## 2024-03-14 ASSESSMENT — PAIN SCALES - GENERAL
PAINLEVEL_OUTOF10: 0
PAINLEVEL_OUTOF10: 1

## 2024-03-14 ASSESSMENT — ENCOUNTER SYMPTOMS: SHORTNESS OF BREATH: 0

## 2024-03-14 ASSESSMENT — PAIN - FUNCTIONAL ASSESSMENT: PAIN_FUNCTIONAL_ASSESSMENT: 0-10

## 2024-03-14 NOTE — ANESTHESIA PROCEDURE NOTES
Arterial Line:    An arterial line was placed using ultrasound guidance, in the pre-op for the following indication(s): continuous blood pressure monitoring.    A 20 gauge (size), 1 and 3/4 inch (length), Arrow (type) catheter was placed, Seldinger technique not used, into the right radial artery, secured by tape and Tegaderm.  Anesthesia type: Local    Events:  patient tolerated procedure well with no complications and EBL < 5mL.3/14/2024 7:43 AM3/14/2024 7:43 AM  Anesthesiologist: Katelynn Taylor MD  Performed: Anesthesiologist   Preanesthetic Checklist  Completed: patient identified, IV checked, risks and benefits discussed, surgical/procedural consents, equipment checked, pre-op evaluation, anesthesia consent given and oxygen available

## 2024-03-14 NOTE — ANESTHESIA PRE PROCEDURE
TONSILLECTOMY     • WISDOM TOOTH EXTRACTION         Social History:    Social History     Tobacco Use   • Smoking status: Never   • Smokeless tobacco: Never   Substance Use Topics   • Alcohol use: Yes     Comment: soc                                Counseling given: Not Answered      Vital Signs (Current):   Vitals:    02/15/24 1116 03/14/24 0653   BP:  (!) 175/98   Pulse:  79   Resp:  16   Temp:  97.2 °F (36.2 °C)   TempSrc:  Temporal   SpO2:  97%   Weight: 79.4 kg (175 lb) 79.7 kg (175 lb 12.8 oz)   Height: 1.753 m (5' 9\")                                               BP Readings from Last 3 Encounters:   03/14/24 (!) 175/98   03/11/24 (!) 146/85       NPO Status: Time of last liquid consumption: 1500                        Time of last solid consumption: 1500                        Date of last liquid consumption: 03/13/24                        Date of last solid food consumption: 03/13/24    BMI:   Wt Readings from Last 3 Encounters:   03/14/24 79.7 kg (175 lb 12.8 oz)   03/11/24 80.8 kg (178 lb 3.2 oz)   03/29/10 81.6 kg (180 lb)     Body mass index is 25.95 kg/m².    CBC:   Lab Results   Component Value Date/Time    WBC 6.9 02/20/2024 10:32 AM    RBC 5.31 02/20/2024 10:32 AM    HGB 10.6 02/20/2024 10:32 AM    HCT 34.2 02/20/2024 10:32 AM    MCV 64.3 02/20/2024 10:32 AM    RDW 19.7 02/20/2024 10:32 AM     02/20/2024 10:32 AM       CMP:   Lab Results   Component Value Date/Time     02/20/2024 10:32 AM    K 4.0 02/20/2024 10:32 AM     02/20/2024 10:32 AM    CO2 26 02/20/2024 10:32 AM    BUN 17 02/20/2024 10:32 AM    CREATININE 1.6 02/20/2024 10:32 AM    AGRATIO 1.3 02/20/2024 10:32 AM    LABGLOM 47 02/20/2024 10:32 AM    GLUCOSE 193 02/20/2024 10:32 AM    PROT 7.6 02/20/2024 10:32 AM    CALCIUM 9.4 02/20/2024 10:32 AM    BILITOT 0.3 02/20/2024 10:32 AM    ALKPHOS 102 02/20/2024 10:32 AM    AST 19 02/20/2024 10:32 AM    ALT 18 02/20/2024 10:32 AM       POC Tests: No results for input(s):

## 2024-03-14 NOTE — H&P
in detail - ordered RC/IC/BPLND  - Referral medical oncology.  - Will order CT a/p with contrast  - will order for wound ostomy consult    Extensive discusison about RBA - pt understands ready to proceed.    FU with procedure.     Past Medical History:  He has a past medical history of Cancer (HCC).     Hospital Problem List:  Active Problems:    * No active hospital problems. *  Resolved Problems:    * No resolved hospital problems. *      Past Surgical History:  He has a past surgical history that includes Tonsillectomy and [place] tooth extraction.     Social History:  He reports that he has never smoked. He has never used smokeless tobacco. He reports current alcohol use. He reports that he does not use drugs.     Family History:  family history includes Cancer in his unknown relative.    Allergies:  No Known Allergies    Medications:  Scheduled Meds:  Continuous Infusions:  PRN Meds:    Review of Systems:  Pertinent positives/negatives reviewed in HPI.  All other systems reviewed and negative, unless noted below.    Constitutional: Negative  Genitourinary: see HPI  HEENT: Negative   Cardiovascular: Negative   Respiratory: Negative   Gastrointestinal: Negative   Musculoskeletal: Negative   Neurological: Negative   Psychiatric: Negative   Integumentary: Negative     PHYSICAL EXAM     Vitals:    03/14/24 0653   BP: (!) 175/98   Pulse: 79   Resp: 16   Temp: 97.2 °F (36.2 °C)   SpO2: 97%     CONSTITUTIONAL: The patient is well nourished/developed, with no distress noted.   CARDIOVASCULAR: normal rate.  RESPIRATOR: non-labored breathing.  GENITOURINARY: Defer to OR; see clinic note for  exam.    Ins/Outs:  No intake or output data in the 24 hours ending 03/14/24 0729    LABS     CBC   Lab Results   Component Value Date/Time    WBC 6.9 02/20/2024 10:32 AM    RBC 5.31 02/20/2024 10:32 AM    HGB 10.6 02/20/2024 10:32 AM    HCT 34.2 02/20/2024 10:32 AM    MCV 64.3 02/20/2024 10:32 AM    MCH 20.0 02/20/2024 10:32 AM

## 2024-03-14 NOTE — ANESTHESIA POSTPROCEDURE EVALUATION
Department of Anesthesiology  Postprocedure Note    Patient: Jay Ruelas  MRN: [id number]  YOB: 1955  Date of evaluation: 3/14/2024    Procedure Summary       Date: 03/14/24 Room / Location: [address]    Anesthesia Start: 0746 Anesthesia Stop: 1540    Procedure: ROBOTIC LAPAROSCOPIC CYSTECTOMY WITH ILEAL CONDUIT CREATION AND BILATERAL LYMPH NODE DISSECTION (Abdomen) Diagnosis:       Malignant neoplasm of bladder wall (HCC)      (Malignant neoplasm of bladder wall (HCC) [C67.9])    Surgeons: Donell Manzanares MD Responsible Provider: Herbert Vázquez MD    Anesthesia Type: general ASA Status: 2            Anesthesia Type: No value filed.    Sri Phase I: Sri Score: 6    Sri Phase II:      Anesthesia Post Evaluation    Patient location during evaluation: PACU  Patient participation: complete - patient participated  Level of consciousness: awake and alert  Airway patency: patent  Nausea & Vomiting: no vomiting and no nausea  Cardiovascular status: hemodynamically stable  Respiratory status: acceptable  Hydration status: stable  Pain management: adequate    No notable events documented.

## 2024-03-14 NOTE — OP NOTE
iliac lymph nodes, Right obturator lymph nodes  4. Left common iliac lymph nodes  5. Left external iliac lymph nodes, Left internal iliac lymph nodes, Left obturator lymph nodes  6. Presacral lymph nodes  7. Right distal ureteral margin for frozen section  8. Left distal ureteral margin for frozen section  9. Right distal ureteral margin #2 for permanent section  10. Left distal ureteral margin #2 for permanent section    Complications: none apparent           Disposition:  PACU - hemodynamically stable.    Plan: Postoperative cystectomy pathway, follow-up pathology, continue ureteral stents x 1 week pending postoperative recovery           Donell Manzanares MD, DAYNE  3/14/2024

## 2024-03-15 LAB
ANION GAP SERPL CALCULATED.3IONS-SCNC: 9 MMOL/L (ref 3–16)
BUN SERPL-MCNC: 20 MG/DL (ref 7–20)
CALCIUM SERPL-MCNC: 8.8 MG/DL (ref 8.3–10.6)
CHLORIDE SERPL-SCNC: 109 MMOL/L (ref 99–110)
CO2 SERPL-SCNC: 22 MMOL/L (ref 21–32)
CREAT SERPL-MCNC: 1.5 MG/DL (ref 0.8–1.3)
GFR SERPLBLD CREATININE-BSD FMLA CKD-EPI: 50 ML/MIN/{1.73_M2}
GLUCOSE SERPL-MCNC: 164 MG/DL (ref 70–99)
HCT VFR BLD AUTO: 30.8 % (ref 40.5–52.5)
HGB BLD-MCNC: 9.6 G/DL (ref 13.5–17.5)
POTASSIUM SERPL-SCNC: 4.6 MMOL/L (ref 3.5–5.1)
SODIUM SERPL-SCNC: 140 MMOL/L (ref 136–145)

## 2024-03-15 PROCEDURE — 6370000000 HC RX 637 (ALT 250 FOR IP): Performed by: UROLOGY

## 2024-03-15 PROCEDURE — 1200000000 HC SEMI PRIVATE

## 2024-03-15 PROCEDURE — 36415 COLL VENOUS BLD VENIPUNCTURE: CPT

## 2024-03-15 PROCEDURE — 6360000002 HC RX W HCPCS: Performed by: UROLOGY

## 2024-03-15 PROCEDURE — 80048 BASIC METABOLIC PNL TOTAL CA: CPT

## 2024-03-15 PROCEDURE — 6370000000 HC RX 637 (ALT 250 FOR IP): Performed by: INTERNAL MEDICINE

## 2024-03-15 PROCEDURE — 2580000003 HC RX 258: Performed by: UROLOGY

## 2024-03-15 PROCEDURE — 85014 HEMATOCRIT: CPT

## 2024-03-15 PROCEDURE — 85018 HEMOGLOBIN: CPT

## 2024-03-15 RX ORDER — AMLODIPINE BESYLATE 5 MG/1
10 TABLET ORAL DAILY
Status: DISCONTINUED | OUTPATIENT
Start: 2024-03-15 | End: 2024-03-19 | Stop reason: HOSPADM

## 2024-03-15 RX ADMIN — DOCUSATE SODIUM 50MG AND SENNOSIDES 8.6MG 1 TABLET: 8.6; 5 TABLET, FILM COATED ORAL at 21:50

## 2024-03-15 RX ADMIN — ACETAMINOPHEN 650 MG: 325 TABLET ORAL at 18:16

## 2024-03-15 RX ADMIN — ACETAMINOPHEN 650 MG: 325 TABLET ORAL at 23:26

## 2024-03-15 RX ADMIN — APIXABAN 5 MG: 5 TABLET, FILM COATED ORAL at 11:36

## 2024-03-15 RX ADMIN — ACETAMINOPHEN 650 MG: 325 TABLET ORAL at 05:49

## 2024-03-15 RX ADMIN — ACETAMINOPHEN 650 MG: 325 TABLET ORAL at 10:17

## 2024-03-15 RX ADMIN — AMLODIPINE BESYLATE 10 MG: 5 TABLET ORAL at 23:26

## 2024-03-15 RX ADMIN — ACETAMINOPHEN 650 MG: 325 TABLET ORAL at 01:13

## 2024-03-15 RX ADMIN — SODIUM CHLORIDE, PRESERVATIVE FREE 10 ML: 5 INJECTION INTRAVENOUS at 07:49

## 2024-03-15 RX ADMIN — NALOXEGOL OXALATE 12.5 MG: 25 TABLET, FILM COATED ORAL at 21:50

## 2024-03-15 RX ADMIN — APIXABAN 5 MG: 5 TABLET, FILM COATED ORAL at 21:51

## 2024-03-15 RX ADMIN — DOCUSATE SODIUM 50MG AND SENNOSIDES 8.6MG 1 TABLET: 8.6; 5 TABLET, FILM COATED ORAL at 07:49

## 2024-03-15 RX ADMIN — SODIUM CHLORIDE, PRESERVATIVE FREE 10 ML: 5 INJECTION INTRAVENOUS at 22:10

## 2024-03-15 RX ADMIN — HEPARIN SODIUM 5000 UNITS: 5000 INJECTION INTRAVENOUS; SUBCUTANEOUS at 05:49

## 2024-03-15 RX ADMIN — NALOXEGOL OXALATE 12.5 MG: 25 TABLET, FILM COATED ORAL at 07:49

## 2024-03-15 RX ADMIN — SODIUM CHLORIDE, POTASSIUM CHLORIDE, SODIUM LACTATE AND CALCIUM CHLORIDE: 600; 310; 30; 20 INJECTION, SOLUTION INTRAVENOUS at 01:13

## 2024-03-15 RX ADMIN — SODIUM CHLORIDE, POTASSIUM CHLORIDE, SODIUM LACTATE AND CALCIUM CHLORIDE: 600; 310; 30; 20 INJECTION, SOLUTION INTRAVENOUS at 22:09

## 2024-03-15 RX ADMIN — OXYCODONE 5 MG: 5 TABLET ORAL at 13:54

## 2024-03-15 ASSESSMENT — PAIN SCALES - GENERAL
PAINLEVEL_OUTOF10: 1
PAINLEVEL_OUTOF10: 0
PAINLEVEL_OUTOF10: 4
PAINLEVEL_OUTOF10: 0
PAINLEVEL_OUTOF10: 1
PAINLEVEL_OUTOF10: 0

## 2024-03-15 ASSESSMENT — PAIN DESCRIPTION - LOCATION: LOCATION: ABDOMEN

## 2024-03-15 ASSESSMENT — PAIN DESCRIPTION - DESCRIPTORS: DESCRIPTORS: SORE

## 2024-03-15 ASSESSMENT — PAIN - FUNCTIONAL ASSESSMENT: PAIN_FUNCTIONAL_ASSESSMENT: ACTIVITIES ARE NOT PREVENTED

## 2024-03-15 ASSESSMENT — PAIN DESCRIPTION - PAIN TYPE: TYPE: SURGICAL PAIN

## 2024-03-15 ASSESSMENT — PAIN DESCRIPTION - ORIENTATION: ORIENTATION: MID

## 2024-03-15 NOTE — DISCHARGE INSTRUCTIONS
UROSTOMY CARE   Empty pouch when it is 1/3 to 1/2 full of urine.   Change urostomy dressing every 3 to 5 days.    Change urostomy dressing whenever it leaks to prevent skin damage.   Measure stoma with each dressing change (25 mm high by 30 mm wide on 3-15-24) until 4-11-24.   May change to 1-piece urostomy dressing when comfortable with measuring and centering new dressing around stoma.   Measure stoma with every other dressing change (once weekly) for 4 weeks, from 4-11-24 to 5-9-24.   Measure stoma once every two weeks for 4 more weeks (from 5-9-24 to 6-6-24) then only measure once monthly.      UROSTOMY DRESSING CHANGE   1. Gather all supplies for dressing change, make sure new drainage pouch end is closed; scissors, dressing #28526 or #[id number], pouch #80153.   2. Empty pouch. Remove old dressing.    3. Clean stoma and surrounding skin with warm water only- no soap or bath wipes.   4. Dry stoma and surrounding skin & leave dry cloth on stoma and stent ends to contain any new urine.   5. Measure stoma (25 mm high by 30 mm wide on 3-15-24). Cut new dressing to fit stoma size.    6. Warm new dressing for 30 seconds by rubbing between hands or placing in warm blanket.    7. Apply new dressing, threading stents through stoma hole. (Save back of dressing for template for next stoma measurement.)   8. Thread stents into drainage pouch. Line white adhesive of drainage pouch up with blue [place] on dressing Landing Pad, apply & check for secure attachment with fingers like checking ziplock bags or tupperware.  9. Place warm hands or blanket over new dressing on abdomen for 3 to 5 minutes to promote adherence.

## 2024-03-15 NOTE — PLAN OF CARE
Problem: Discharge Planning  Goal: Discharge to home or other facility with appropriate resources  3/15/2024 0852 by Julien Dozier RN  Outcome: Progressing  3/14/2024 2212 by Daljit Chamberlain RN  Outcome: Progressing  Flowsheets (Taken 3/14/2024 2146)  Discharge to home or other facility with appropriate resources: Identify barriers to discharge with patient and caregiver     Problem: Pain  Goal: Verbalizes/displays adequate comfort level or baseline comfort level  3/15/2024 0852 by Julien Dozier RN  Outcome: Progressing  Flowsheets (Taken 3/15/2024 0115 by Daljit Chamberlain RN)  Verbalizes/displays adequate comfort level or baseline comfort level: Encourage patient to monitor pain and request assistance  3/14/2024 2212 by Daljit Chamberlain RN  Outcome: Progressing     Problem: Skin/Tissue Integrity  Goal: Absence of new skin breakdown  Description: 1.  Monitor for areas of redness and/or skin breakdown  2.  Assess vascular access sites hourly  3.  Every 4-6 hours minimum:  Change oxygen saturation probe site  4.  Every 4-6 hours:  If on nasal continuous positive airway pressure, respiratory therapy assess nares and determine need for appliance change or resting period.  3/15/2024 0852 by Julien Dozier RN  Outcome: Progressing  3/14/2024 2212 by Daljit Chamberlain RN  Outcome: Progressing     Problem: Safety - Adult  Goal: Free from fall injury  3/15/2024 0852 by Julien Dozier RN  Outcome: Progressing  3/14/2024 2212 by Daljit Chamberlain RN  Outcome: Progressing     Problem: ABCDS Injury Assessment  Goal: Absence of physical injury  3/15/2024 0852 by Julien Dozier RN  Outcome: Progressing  3/14/2024 2212 by Daljit Chamberlain RN  Outcome: Progressing

## 2024-03-16 LAB
ANION GAP SERPL CALCULATED.3IONS-SCNC: 10 MMOL/L (ref 3–16)
BASOPHILS # BLD: 0 K/UL (ref 0–0.2)
BASOPHILS NFR BLD: 0.2 %
BUN SERPL-MCNC: 14 MG/DL (ref 7–20)
CALCIUM SERPL-MCNC: 8.6 MG/DL (ref 8.3–10.6)
CHLORIDE SERPL-SCNC: 105 MMOL/L (ref 99–110)
CO2 SERPL-SCNC: 24 MMOL/L (ref 21–32)
CREAT SERPL-MCNC: 1.2 MG/DL (ref 0.8–1.3)
DEPRECATED RDW RBC AUTO: 19.5 % (ref 12.4–15.4)
EOSINOPHIL # BLD: 0 K/UL (ref 0–0.6)
EOSINOPHIL NFR BLD: 0 %
GFR SERPLBLD CREATININE-BSD FMLA CKD-EPI: >60 ML/MIN/{1.73_M2}
GLUCOSE SERPL-MCNC: 103 MG/DL (ref 70–99)
HCT VFR BLD AUTO: 29.5 % (ref 40.5–52.5)
HGB BLD-MCNC: 9.1 G/DL (ref 13.5–17.5)
LYMPHOCYTES # BLD: 1.5 K/UL (ref 1–5.1)
LYMPHOCYTES NFR BLD: 8.2 %
MCH RBC QN AUTO: 19.8 PG (ref 26–34)
MCHC RBC AUTO-ENTMCNC: 30.9 G/DL (ref 31–36)
MCV RBC AUTO: 63.9 FL (ref 80–100)
MONOCYTES # BLD: 1.2 K/UL (ref 0–1.3)
MONOCYTES NFR BLD: 6.6 %
NEUTROPHILS # BLD: 15.1 K/UL (ref 1.7–7.7)
NEUTROPHILS NFR BLD: 85 %
PLATELET # BLD AUTO: 314 K/UL (ref 135–450)
PMV BLD AUTO: 8 FL (ref 5–10.5)
POTASSIUM SERPL-SCNC: 4.1 MMOL/L (ref 3.5–5.1)
RBC # BLD AUTO: 4.62 M/UL (ref 4.2–5.9)
SODIUM SERPL-SCNC: 139 MMOL/L (ref 136–145)
WBC # BLD AUTO: 17.8 K/UL (ref 4–11)

## 2024-03-16 PROCEDURE — 85025 COMPLETE CBC W/AUTO DIFF WBC: CPT

## 2024-03-16 PROCEDURE — 6370000000 HC RX 637 (ALT 250 FOR IP): Performed by: NURSE PRACTITIONER

## 2024-03-16 PROCEDURE — 6370000000 HC RX 637 (ALT 250 FOR IP): Performed by: UROLOGY

## 2024-03-16 PROCEDURE — 2580000003 HC RX 258: Performed by: UROLOGY

## 2024-03-16 PROCEDURE — 6370000000 HC RX 637 (ALT 250 FOR IP): Performed by: INTERNAL MEDICINE

## 2024-03-16 PROCEDURE — 80048 BASIC METABOLIC PNL TOTAL CA: CPT

## 2024-03-16 PROCEDURE — 83036 HEMOGLOBIN GLYCOSYLATED A1C: CPT

## 2024-03-16 PROCEDURE — 1200000000 HC SEMI PRIVATE

## 2024-03-16 RX ORDER — LANOLIN ALCOHOL/MO/W.PET/CERES
3 CREAM (GRAM) TOPICAL NIGHTLY PRN
Status: DISCONTINUED | OUTPATIENT
Start: 2024-03-16 | End: 2024-03-19 | Stop reason: HOSPADM

## 2024-03-16 RX ADMIN — ACETAMINOPHEN 650 MG: 325 TABLET ORAL at 18:31

## 2024-03-16 RX ADMIN — ACETAMINOPHEN 650 MG: 325 TABLET ORAL at 11:04

## 2024-03-16 RX ADMIN — MELATONIN TAB 3 MG 3 MG: 3 TAB at 21:28

## 2024-03-16 RX ADMIN — APIXABAN 5 MG: 5 TABLET, FILM COATED ORAL at 08:28

## 2024-03-16 RX ADMIN — SODIUM CHLORIDE, PRESERVATIVE FREE 10 ML: 5 INJECTION INTRAVENOUS at 21:18

## 2024-03-16 RX ADMIN — DOCUSATE SODIUM 50MG AND SENNOSIDES 8.6MG 1 TABLET: 8.6; 5 TABLET, FILM COATED ORAL at 08:28

## 2024-03-16 RX ADMIN — NALOXEGOL OXALATE 12.5 MG: 25 TABLET, FILM COATED ORAL at 21:17

## 2024-03-16 RX ADMIN — ACETAMINOPHEN 650 MG: 325 TABLET ORAL at 06:21

## 2024-03-16 RX ADMIN — AMLODIPINE BESYLATE 10 MG: 5 TABLET ORAL at 08:28

## 2024-03-16 RX ADMIN — APIXABAN 5 MG: 5 TABLET, FILM COATED ORAL at 21:17

## 2024-03-16 RX ADMIN — SODIUM CHLORIDE, POTASSIUM CHLORIDE, SODIUM LACTATE AND CALCIUM CHLORIDE: 600; 310; 30; 20 INJECTION, SOLUTION INTRAVENOUS at 06:50

## 2024-03-16 RX ADMIN — NALOXEGOL OXALATE 12.5 MG: 25 TABLET, FILM COATED ORAL at 08:28

## 2024-03-16 ASSESSMENT — PAIN SCALES - GENERAL: PAINLEVEL_OUTOF10: 0

## 2024-03-16 NOTE — CONSULTS
Hospital Medicine Consult        Date of Admission:  03/16/24  MRN: [id number]  Date of Service: 03/16/24  Location:  [facility]       CC:  Hypertension   MD requesting consult:  Juan David Saravia       HISTORY OF PRESENT ILLNESS:     Jay Ruelas is a 68 y.o. male with a PMHx of bladder cancer who was admitted on 3/14/24 for robotic laparoscopic cystectomy with ileal conduit creation.  He is POD #2 and has noted some blood-tinged urine today, but otherwise has been feeling well.  His blood pressure was noted to be trending higher.  He denies any prior hx of HTN.  Review of the pt's chart does show several visits to his PCP office visits with elevated blood pressures ranging from 130s-150s systolic.  He reached as high as 170s/80s earlier this evening.  He denies any headache, N/V, palpitations, dyspnea, chest pain, or leg swelling.      External medical records reviewed.    PAST MEDICAL HX:  Past Medical History:   Diagnosis Date    Cancer (HCC)        SURGICAL HX:  Past Surgical History:   Procedure Laterality Date    TONSILLECTOMY      UROLOGICAL SURGERY N/A 3/14/2024    ROBOTIC LAPAROSCOPIC CYSTECTOMY WITH ILEAL CONDUIT CREATION AND BILATERAL LYMPH NODE DISSECTION performed by Donell Manzanares MD at [facility] OR    WISDOM TOOTH EXTRACTION         FAMILY HX:  Family History   Problem Relation Age of Onset    Cancer Unknown        SOCIAL HX:  Social History     Tobacco Use    Smoking status: Never    Smokeless tobacco: Never   Vaping Use    Vaping Use: Never used   Substance Use Topics    Alcohol use: Yes     Comment: soc    Drug use: No        ALLERGIES:  No Known Allergies    HOME MEDICATIONS:     Prior to Admission medications    Medication Sig Start Date End Date Taking? Authorizing Provider   apixaban (ELIQUIS) 5 MG TABS tablet Take 1 tablet by mouth

## 2024-03-16 NOTE — PLAN OF CARE
Problem: Discharge Planning  Goal: Discharge to home or other facility with appropriate resources  Outcome: Progressing  Flowsheets (Taken 3/15/2024 1927 by Daljit Chamberlain RN)  Discharge to home or other facility with appropriate resources: Identify barriers to discharge with patient and caregiver     Problem: Pain  Goal: Verbalizes/displays adequate comfort level or baseline comfort level  Outcome: Progressing  Flowsheets  Taken 3/16/2024 0400 by Daljit Chamberlain RN  Verbalizes/displays adequate comfort level or baseline comfort level: Encourage patient to monitor pain and request assistance  Taken 3/15/2024 1923 by Daljit Chamberlain RN  Verbalizes/displays adequate comfort level or baseline comfort level: Encourage patient to monitor pain and request assistance     Problem: Skin/Tissue Integrity  Goal: Absence of new skin breakdown  Description: 1.  Monitor for areas of redness and/or skin breakdown  2.  Assess vascular access sites hourly  3.  Every 4-6 hours minimum:  Change oxygen saturation probe site  4.  Every 4-6 hours:  If on nasal continuous positive airway pressure, respiratory therapy assess nares and determine need for appliance change or resting period.  Outcome: Progressing     Problem: Safety - Adult  Goal: Free from fall injury  Outcome: Progressing     Problem: ABCDS Injury Assessment  Goal: Absence of physical injury  Outcome: Progressing     Problem: Nutrition Deficit:  Goal: Optimize nutritional status  Outcome: Progressing

## 2024-03-17 LAB
ANION GAP SERPL CALCULATED.3IONS-SCNC: 10 MMOL/L (ref 3–16)
BUN SERPL-MCNC: 14 MG/DL (ref 7–20)
CALCIUM SERPL-MCNC: 8.6 MG/DL (ref 8.3–10.6)
CHLORIDE SERPL-SCNC: 104 MMOL/L (ref 99–110)
CO2 SERPL-SCNC: 22 MMOL/L (ref 21–32)
CREAT SERPL-MCNC: 1 MG/DL (ref 0.8–1.3)
DEPRECATED RDW RBC AUTO: 19.9 % (ref 12.4–15.4)
EST. AVERAGE GLUCOSE BLD GHB EST-MCNC: 131.2 MG/DL
GFR SERPLBLD CREATININE-BSD FMLA CKD-EPI: >60 ML/MIN/{1.73_M2}
GLUCOSE BLD-MCNC: 109 MG/DL (ref 70–99)
GLUCOSE BLD-MCNC: 142 MG/DL (ref 70–99)
GLUCOSE SERPL-MCNC: 113 MG/DL (ref 70–99)
HBA1C MFR BLD: 6.2 %
HCT VFR BLD AUTO: 27.9 % (ref 40.5–52.5)
HGB BLD-MCNC: 8.6 G/DL (ref 13.5–17.5)
MCH RBC QN AUTO: 19.9 PG (ref 26–34)
MCHC RBC AUTO-ENTMCNC: 30.8 G/DL (ref 31–36)
MCV RBC AUTO: 64.7 FL (ref 80–100)
PERFORMED ON: ABNORMAL
PERFORMED ON: ABNORMAL
PLATELET # BLD AUTO: 291 K/UL (ref 135–450)
PMV BLD AUTO: 8 FL (ref 5–10.5)
POTASSIUM SERPL-SCNC: 4.1 MMOL/L (ref 3.5–5.1)
RBC # BLD AUTO: 4.31 M/UL (ref 4.2–5.9)
SODIUM SERPL-SCNC: 136 MMOL/L (ref 136–145)
WBC # BLD AUTO: 10.6 K/UL (ref 4–11)

## 2024-03-17 PROCEDURE — 6370000000 HC RX 637 (ALT 250 FOR IP): Performed by: INTERNAL MEDICINE

## 2024-03-17 PROCEDURE — 80048 BASIC METABOLIC PNL TOTAL CA: CPT

## 2024-03-17 PROCEDURE — 83036 HEMOGLOBIN GLYCOSYLATED A1C: CPT

## 2024-03-17 PROCEDURE — 2580000003 HC RX 258: Performed by: UROLOGY

## 2024-03-17 PROCEDURE — 1200000000 HC SEMI PRIVATE

## 2024-03-17 PROCEDURE — 6370000000 HC RX 637 (ALT 250 FOR IP): Performed by: NURSE PRACTITIONER

## 2024-03-17 PROCEDURE — 6370000000 HC RX 637 (ALT 250 FOR IP): Performed by: UROLOGY

## 2024-03-17 PROCEDURE — 36415 COLL VENOUS BLD VENIPUNCTURE: CPT

## 2024-03-17 PROCEDURE — 85027 COMPLETE CBC AUTOMATED: CPT

## 2024-03-17 RX ADMIN — MELATONIN TAB 3 MG 3 MG: 3 TAB at 20:26

## 2024-03-17 RX ADMIN — NALOXEGOL OXALATE 12.5 MG: 25 TABLET, FILM COATED ORAL at 20:26

## 2024-03-17 RX ADMIN — ACETAMINOPHEN 650 MG: 325 TABLET ORAL at 16:45

## 2024-03-17 RX ADMIN — SODIUM CHLORIDE, POTASSIUM CHLORIDE, SODIUM LACTATE AND CALCIUM CHLORIDE: 600; 310; 30; 20 INJECTION, SOLUTION INTRAVENOUS at 00:40

## 2024-03-17 RX ADMIN — ACETAMINOPHEN 650 MG: 325 TABLET ORAL at 06:11

## 2024-03-17 RX ADMIN — AMLODIPINE BESYLATE 10 MG: 5 TABLET ORAL at 08:51

## 2024-03-17 RX ADMIN — APIXABAN 5 MG: 5 TABLET, FILM COATED ORAL at 20:26

## 2024-03-17 RX ADMIN — ACETAMINOPHEN 650 MG: 325 TABLET ORAL at 00:40

## 2024-03-17 RX ADMIN — SODIUM CHLORIDE, PRESERVATIVE FREE 10 ML: 5 INJECTION INTRAVENOUS at 08:52

## 2024-03-17 RX ADMIN — NALOXEGOL OXALATE 12.5 MG: 25 TABLET, FILM COATED ORAL at 08:51

## 2024-03-17 RX ADMIN — ACETAMINOPHEN 650 MG: 325 TABLET ORAL at 12:40

## 2024-03-17 RX ADMIN — SODIUM CHLORIDE, POTASSIUM CHLORIDE, SODIUM LACTATE AND CALCIUM CHLORIDE: 600; 310; 30; 20 INJECTION, SOLUTION INTRAVENOUS at 16:40

## 2024-03-17 RX ADMIN — DOCUSATE SODIUM 50MG AND SENNOSIDES 8.6MG 1 TABLET: 8.6; 5 TABLET, FILM COATED ORAL at 20:26

## 2024-03-17 RX ADMIN — SODIUM CHLORIDE, POTASSIUM CHLORIDE, SODIUM LACTATE AND CALCIUM CHLORIDE: 600; 310; 30; 20 INJECTION, SOLUTION INTRAVENOUS at 08:59

## 2024-03-17 RX ADMIN — APIXABAN 5 MG: 5 TABLET, FILM COATED ORAL at 08:51

## 2024-03-17 RX ADMIN — DOCUSATE SODIUM 50MG AND SENNOSIDES 8.6MG 1 TABLET: 8.6; 5 TABLET, FILM COATED ORAL at 08:51

## 2024-03-17 ASSESSMENT — PAIN DESCRIPTION - DESCRIPTORS: DESCRIPTORS: SORE

## 2024-03-17 ASSESSMENT — PAIN SCALES - GENERAL
PAINLEVEL_OUTOF10: 0

## 2024-03-17 NOTE — PLAN OF CARE
Problem: Discharge Planning  Goal: Discharge to home or other facility with appropriate resources  Outcome: Progressing  Flowsheets  Taken 3/17/2024 0845 by Matilde Lopes, RN  Discharge to home or other facility with appropriate resources:   Identify barriers to discharge with patient and caregiver   Arrange for needed discharge resources and transportation as appropriate   Identify discharge learning needs (meds, wound care, etc)   Refer to discharge planning if patient needs post-hospital services based on physician order or complex needs related to functional status, cognitive ability or social support system  Taken 3/16/2024 2121 by Daljit Chamberlain RN  Discharge to home or other facility with appropriate resources: Identify barriers to discharge with patient and caregiver     Problem: Pain  Goal: Verbalizes/displays adequate comfort level or baseline comfort level  Outcome: Progressing  Flowsheets (Taken 3/16/2024 1911 by Daljit Chamberlain, RN)  Verbalizes/displays adequate comfort level or baseline comfort level: Encourage patient to monitor pain and request assistance     Problem: Skin/Tissue Integrity  Goal: Absence of new skin breakdown  Description: 1.  Monitor for areas of redness and/or skin breakdown  2.  Assess vascular access sites hourly  3.  Every 4-6 hours minimum:  Change oxygen saturation probe site  4.  Every 4-6 hours:  If on nasal continuous positive airway pressure, respiratory therapy assess nares and determine need for appliance change or resting period.  Outcome: Progressing     Problem: Safety - Adult  Goal: Free from fall injury  Outcome: Progressing     Problem: ABCDS Injury Assessment  Goal: Absence of physical injury  Outcome: Progressing     Problem: Nutrition Deficit:  Goal: Optimize nutritional status  Outcome: Progressing

## 2024-03-18 LAB
ANION GAP SERPL CALCULATED.3IONS-SCNC: 10 MMOL/L (ref 3–16)
BUN SERPL-MCNC: 14 MG/DL (ref 7–20)
CALCIUM SERPL-MCNC: 8.8 MG/DL (ref 8.3–10.6)
CHLORIDE SERPL-SCNC: 102 MMOL/L (ref 99–110)
CO2 SERPL-SCNC: 25 MMOL/L (ref 21–32)
CREAT SERPL-MCNC: 1 MG/DL (ref 0.8–1.3)
DEPRECATED RDW RBC AUTO: 20.3 % (ref 12.4–15.4)
EST. AVERAGE GLUCOSE BLD GHB EST-MCNC: 131.2 MG/DL
GFR SERPLBLD CREATININE-BSD FMLA CKD-EPI: >60 ML/MIN/{1.73_M2}
GLUCOSE BLD-MCNC: 118 MG/DL (ref 70–99)
GLUCOSE BLD-MCNC: 142 MG/DL (ref 70–99)
GLUCOSE BLD-MCNC: 193 MG/DL (ref 70–99)
GLUCOSE SERPL-MCNC: 182 MG/DL (ref 70–99)
HBA1C MFR BLD: 6.2 %
HCT VFR BLD AUTO: 28.5 % (ref 40.5–52.5)
HGB BLD-MCNC: 8.8 G/DL (ref 13.5–17.5)
MAGNESIUM SERPL-MCNC: 1.6 MG/DL (ref 1.8–2.4)
MCH RBC QN AUTO: 19.9 PG (ref 26–34)
MCHC RBC AUTO-ENTMCNC: 30.8 G/DL (ref 31–36)
MCV RBC AUTO: 64.5 FL (ref 80–100)
PERFORMED ON: ABNORMAL
PLATELET # BLD AUTO: 334 K/UL (ref 135–450)
PMV BLD AUTO: 7.6 FL (ref 5–10.5)
POTASSIUM SERPL-SCNC: 3.5 MMOL/L (ref 3.5–5.1)
RBC # BLD AUTO: 4.41 M/UL (ref 4.2–5.9)
SODIUM SERPL-SCNC: 137 MMOL/L (ref 136–145)
WBC # BLD AUTO: 7.7 K/UL (ref 4–11)

## 2024-03-18 PROCEDURE — 36415 COLL VENOUS BLD VENIPUNCTURE: CPT

## 2024-03-18 PROCEDURE — 1200000000 HC SEMI PRIVATE

## 2024-03-18 PROCEDURE — 80048 BASIC METABOLIC PNL TOTAL CA: CPT

## 2024-03-18 PROCEDURE — 85027 COMPLETE CBC AUTOMATED: CPT

## 2024-03-18 PROCEDURE — 6370000000 HC RX 637 (ALT 250 FOR IP): Performed by: NURSE PRACTITIONER

## 2024-03-18 PROCEDURE — 6370000000 HC RX 637 (ALT 250 FOR IP): Performed by: UROLOGY

## 2024-03-18 PROCEDURE — 6360000002 HC RX W HCPCS: Performed by: NURSE PRACTITIONER

## 2024-03-18 PROCEDURE — 94760 N-INVAS EAR/PLS OXIMETRY 1: CPT

## 2024-03-18 PROCEDURE — 83735 ASSAY OF MAGNESIUM: CPT

## 2024-03-18 PROCEDURE — 6370000000 HC RX 637 (ALT 250 FOR IP): Performed by: INTERNAL MEDICINE

## 2024-03-18 RX ORDER — MAGNESIUM SULFATE IN WATER 40 MG/ML
2000 INJECTION, SOLUTION INTRAVENOUS ONCE
Status: COMPLETED | OUTPATIENT
Start: 2024-03-18 | End: 2024-03-18

## 2024-03-18 RX ORDER — AMLODIPINE BESYLATE 10 MG/1
10 TABLET ORAL DAILY
Qty: 30 TABLET | Refills: 0 | Status: SHIPPED | OUTPATIENT
Start: 2024-03-18

## 2024-03-18 RX ADMIN — MAGNESIUM SULFATE HEPTAHYDRATE 2000 MG: 40 INJECTION, SOLUTION INTRAVENOUS at 10:59

## 2024-03-18 RX ADMIN — AMLODIPINE BESYLATE 10 MG: 5 TABLET ORAL at 09:41

## 2024-03-18 RX ADMIN — APIXABAN 5 MG: 5 TABLET, FILM COATED ORAL at 21:44

## 2024-03-18 RX ADMIN — NALOXEGOL OXALATE 12.5 MG: 25 TABLET, FILM COATED ORAL at 21:44

## 2024-03-18 RX ADMIN — ACETAMINOPHEN 650 MG: 325 TABLET ORAL at 04:49

## 2024-03-18 RX ADMIN — ACETAMINOPHEN 650 MG: 325 TABLET ORAL at 11:00

## 2024-03-18 RX ADMIN — APIXABAN 5 MG: 5 TABLET, FILM COATED ORAL at 09:40

## 2024-03-18 RX ADMIN — NALOXEGOL OXALATE 12.5 MG: 25 TABLET, FILM COATED ORAL at 09:41

## 2024-03-18 RX ADMIN — ACETAMINOPHEN 650 MG: 325 TABLET ORAL at 21:44

## 2024-03-18 RX ADMIN — ACETAMINOPHEN 650 MG: 325 TABLET ORAL at 17:41

## 2024-03-18 RX ADMIN — MELATONIN TAB 3 MG 3 MG: 3 TAB at 21:47

## 2024-03-18 RX ADMIN — DOCUSATE SODIUM 50MG AND SENNOSIDES 8.6MG 1 TABLET: 8.6; 5 TABLET, FILM COATED ORAL at 21:44

## 2024-03-18 RX ADMIN — DOCUSATE SODIUM 50MG AND SENNOSIDES 8.6MG 1 TABLET: 8.6; 5 TABLET, FILM COATED ORAL at 09:41

## 2024-03-18 ASSESSMENT — PAIN SCALES - GENERAL
PAINLEVEL_OUTOF10: 4
PAINLEVEL_OUTOF10: 0

## 2024-03-18 ASSESSMENT — PAIN DESCRIPTION - LOCATION: LOCATION: ABDOMEN

## 2024-03-18 NOTE — DISCHARGE INSTR - COC
Update Admission H&P: Changes in H&P as follows -   S/p Robotic assisted laparoscopic radical cystectomy with ileal conduit     PHYSICIAN SIGNATURE:  Electronically signed by Dr Negar Mcrae / MIRANDA Marrero CNP on 3/19/24 at 11:28 AM EDT

## 2024-03-19 VITALS
WEIGHT: 175.8 LBS | SYSTOLIC BLOOD PRESSURE: 128 MMHG | TEMPERATURE: 97.8 F | BODY MASS INDEX: 26.04 KG/M2 | RESPIRATION RATE: 16 BRPM | HEIGHT: 69 IN | OXYGEN SATURATION: 99 % | DIASTOLIC BLOOD PRESSURE: 73 MMHG | HEART RATE: 91 BPM

## 2024-03-19 LAB
ANION GAP SERPL CALCULATED.3IONS-SCNC: 10 MMOL/L (ref 3–16)
BUN SERPL-MCNC: 15 MG/DL (ref 7–20)
CALCIUM SERPL-MCNC: 8.7 MG/DL (ref 8.3–10.6)
CHLORIDE SERPL-SCNC: 105 MMOL/L (ref 99–110)
CO2 SERPL-SCNC: 24 MMOL/L (ref 21–32)
CREAT SERPL-MCNC: 1 MG/DL (ref 0.8–1.3)
GFR SERPLBLD CREATININE-BSD FMLA CKD-EPI: >60 ML/MIN/{1.73_M2}
GLUCOSE BLD-MCNC: 118 MG/DL (ref 70–99)
GLUCOSE BLD-MCNC: 137 MG/DL (ref 70–99)
GLUCOSE SERPL-MCNC: 115 MG/DL (ref 70–99)
MAGNESIUM SERPL-MCNC: 1.8 MG/DL (ref 1.8–2.4)
PERFORMED ON: ABNORMAL
PERFORMED ON: ABNORMAL
POTASSIUM SERPL-SCNC: 3.8 MMOL/L (ref 3.5–5.1)
SODIUM SERPL-SCNC: 139 MMOL/L (ref 136–145)

## 2024-03-19 PROCEDURE — 83735 ASSAY OF MAGNESIUM: CPT

## 2024-03-19 PROCEDURE — 6370000000 HC RX 637 (ALT 250 FOR IP): Performed by: INTERNAL MEDICINE

## 2024-03-19 PROCEDURE — 36415 COLL VENOUS BLD VENIPUNCTURE: CPT

## 2024-03-19 PROCEDURE — 6370000000 HC RX 637 (ALT 250 FOR IP): Performed by: UROLOGY

## 2024-03-19 PROCEDURE — 80048 BASIC METABOLIC PNL TOTAL CA: CPT

## 2024-03-19 RX ADMIN — NALOXEGOL OXALATE 12.5 MG: 25 TABLET, FILM COATED ORAL at 10:24

## 2024-03-19 RX ADMIN — APIXABAN 5 MG: 5 TABLET, FILM COATED ORAL at 10:24

## 2024-03-19 RX ADMIN — AMLODIPINE BESYLATE 10 MG: 5 TABLET ORAL at 10:24

## 2024-03-19 RX ADMIN — DOCUSATE SODIUM 50MG AND SENNOSIDES 8.6MG 1 TABLET: 8.6; 5 TABLET, FILM COATED ORAL at 10:24

## 2024-03-19 NOTE — DISCHARGE SUMMARY
Urology Discharge Summary  [facility]    Provider: MIRANDA Vilchis CNP  Patient ID:  Admission Date: 3/14/2024 Name: Jay Ruelas  OR Date: 3/14/2024 MRN: [id number]   Patient Location: [id number] : 1955  Attending: Donell Manzanares MD Date of Service: 3/19/2024  PCP: Bernard Beltran MD     Discharge date: 3/19/2024    Discharge Diagnoses:   1. Preop testing    2. Malignant neoplasm of bladder wall (HCC)        Reason for Hospitalization: Jay Ruelas is a 68 y.o. male who was admitted post surgery.    Operations/Procedures Performed:   1.  Robotic cystectomy, ileal conduit, bilateral pelvic lymph node dissection performed on 3-    Discharge Condition: Stable    Hospital Course: The patient was admitted on 3/14/2024 and underwent the above mentioned procedure(s). He tolerated the procedure well with no apparent complications. Please see full operative report for further details regarding the operation. Postoperatively the patient remained in stable condition. Pain was controlled post-operatively with oral and IV medication. Diet was advanced and this was tolerated well.  At the time of discharge, the patient was tolerating oral food and hydration, was ambulating without difficulty, and pain was controlled on oral medications. The patient was determined to be suitable for discharge and the patient felt comfortable with that decision. Patient was discharged in good condition. Separate discharge instructions provided to the patient or their caregiver.     Consults: None     Significant Diagnostic findings: See operative reports    Discharge Exam:  Blood pressure 117/72, pulse 94, temperature 97.8 °F (36.6 °C), temperature source Oral, resp. rate 16, height 1.753 m (5' 9\"), weight 79.7 kg (175 lb 12.8 oz), SpO2 96 %.  Gen - NAD, AOx3  CV - RRR  Resp - CTAB  Abd - soft, NT/ND, inc c/d/i  Ext - warm, well-perfused    See progress note exam on day of discharge    Disposition:

## 2024-03-19 NOTE — PLAN OF CARE
Problem: Discharge Planning  Goal: Discharge to home or other facility with appropriate resources  3/19/2024 1132 by Maye Crook RN  Outcome: Progressing  3/19/2024 0304 by Deyanira Sheppard RN  Outcome: Progressing     Problem: Pain  Goal: Verbalizes/displays adequate comfort level or baseline comfort level  3/19/2024 1132 by Maye Crook RN  Outcome: Progressing  3/19/2024 0304 by Deyanira Sheppard RN  Outcome: Progressing     Problem: Skin/Tissue Integrity  Goal: Absence of new skin breakdown  Description: 1.  Monitor for areas of redness and/or skin breakdown  2.  Assess vascular access sites hourly  3.  Every 4-6 hours minimum:  Change oxygen saturation probe site  4.  Every 4-6 hours:  If on nasal continuous positive airway pressure, respiratory therapy assess nares and determine need for appliance change or resting period.  3/19/2024 1132 by Maye Crook RN  Outcome: Progressing  3/19/2024 0304 by Deyanira Sheppard RN  Outcome: Progressing     Problem: Safety - Adult  Goal: Free from fall injury  3/19/2024 1132 by Maye Crook RN  Outcome: Progressing  3/19/2024 0304 by Deyanira Sheppard RN  Outcome: Progressing     Problem: ABCDS Injury Assessment  Goal: Absence of physical injury  3/19/2024 1132 by Maye Crook RN  Outcome: Progressing  3/19/2024 0304 by Deyanira Sheppard RN  Outcome: Progressing     Problem: Nutrition Deficit:  Goal: Optimize nutritional status  3/19/2024 1132 by Maye Crook RN  Outcome: Progressing  3/19/2024 0304 by Deyanira Sheppard RN  Outcome: Progressing

## 2024-03-19 NOTE — PROGRESS NOTES
[facility] HOSPITALISTS PROGRESS NOTE    3/19/2024 11:06 AM        Name: Jay Ruelas .              Admitted: 3/14/2024  Primary Care Provider: Bernard Beltran MD (Tel: [phone])      Chief complaint: 69 yo male with hx bladder cancer, presented for surgical resection. Hospitalist service consulted for HTN management.     3/14/2024: S/p Robotic assisted laparoscopic radical cystectomy with ileal conduit    Subjective:  Up in bedside chair. Says he feels pretty good, only minimal operative discomfort. Has been up in room, denies lightheadedness, dizziness. Denies chest pain, shortness of breath.     Reviewed interval ancillary notes    Current Medications  melatonin tablet 3 mg, Nightly PRN  apixaban (ELIQUIS) tablet 5 mg, BID  naloxegol (MOVANTIK) tablet 12.5 mg, BID  amLODIPine (NORVASC) tablet 10 mg, Daily  sodium chloride flush 0.9 % injection 5-40 mL, 2 times per day  sodium chloride flush 0.9 % injection 5-40 mL, PRN  0.9 % sodium chloride infusion, PRN  HYDROmorphone HCl PF (DILAUDID) injection 0.25 mg, Q3H PRN   Or  HYDROmorphone HCl PF (DILAUDID) injection 0.5 mg, Q4H PRN  lactated ringers IV soln infusion, Continuous  acetaminophen (TYLENOL) tablet 650 mg, Q6H  oxyCODONE (ROXICODONE) immediate release tablet 5 mg, Q4H PRN  sennosides-docusate sodium (SENOKOT-S) 8.6-50 MG tablet 1 tablet, BID  ondansetron (ZOFRAN) injection 4 mg, Q6H PRN        Objective:  /72   Pulse 94   Temp 97.8 °F (36.6 °C) (Oral)   Resp 16   Ht 1.753 m (5' 9\")   Wt 79.7 kg (175 lb 12.8 oz)   SpO2 96%   BMI 25.96 kg/m²     Intake/Output Summary (Last 24 hours) at 3/19/2024 1106  Last data filed at 3/19/2024 1028  Gross per 24 hour   Intake --   Output 4075 ml   Net -4075 ml      Wt Readings from Last 3 Encounters:   03/14/24 79.7 kg (175 lb 12.8 oz)   03/11/24 80.8 kg (178 lb 3.2 oz)   03/29/10 81.6 kg (180 lb)     General:  Awake, alert, 
    Hospitalist Progress Note      Name:  Jay Ruelas /Age/Sex: 1955  (68 y.o. male)   MRN & CSN:  [id number] & [id number] Encounter Date/Time: 3/18/2024 7:23 AM EDT   Location:  [facility]4484/4484-01 PCP: Bernard Beltran MD     Attending:Donell Manzanares MD       Hospital Day: 5    Subjective:   Chief Complaint:  Bladder cancer surgery     Jay Ruelas is a 68 y.o. male with a past medical history of bladder CA who presented with for surgical resection.  S/p Robotic assisted laparoscopic radical cystectomy with ileal conduit, Bilateral pelvic lymphadenectomy, extended template with Dr. Manzanares.  BP became uncontrolled and hospitalist was consulted for hypertension management.      Interval History:  Today, he is sitting up in the chair.  Soto removes yesterday.  Urostomy draining well. He has seen [facility] for ostomy training.  Denies CP ro SOB.  BP much better controlled.  Prediabetes on labs, he admits to drinking several sprites most days, he did stop drinking beer recently.      Independently reviewed interval ancillary notes from urology.     Assessment and Recommendations   Problem List  Principal Problem:    Bladder cancer (HCC)  Resolved Problems:    * No resolved hospital problems. *     Assessment and Plan:    Hypertension   - No prior diagnosis of hypertension, felt to be chronic   - SBP up to 170's   - Amlodipine 10 mg daily initiated 3/15    ~ Controlled   Leukocytosis   - Likely reactive to surgery   - Daily CBC, monitor for fever  Bladder CA   - S/p Robotic assisted laparoscopic radical cystectomy with ileal conduit, Bilateral pelvic lymphadenectomy, extended template  - Per urology   JOSE   - Cr 1.5 on admission, likely due to obstructive uropathy    - Cr 1.10 today   Hyperglycemia   - Escalate POCT glucose to 4 times daily   -  A1C 6.2   - Discussed dietary changes, he drinks a lot of sprite vs initiation of metformin, recommend deferring until he is at least recovered from surgery with possible 
  Urology Progress Note  [facility]     Patient: Jay Ruelas MRN: [id number]  Room/Bed: [facility]4484/4484-01   YOB: 1955  Age/Sex: 68 y.o.male  Admission Date: 3/14/2024     Date of Service:  3/18/2024    ASSESSMENT/PLAN     POD #4 s/p Robotic assisted laparoscopic radical cystectomy with ileal conduit      Infection: yuriy-operative antibiotics were given for 24 hours after surgery, monitor WBC and vitals  GI: tolerating PO intake. PRN zofran. Senna S BID and dulcolax daily PRN  Diet: advance as tolerated to regular, discussed slow progress.   Pain: IV and PO pain medications  Pulm: encourage IS 10 x hourly  Vitals: per nursing routine  Catheters: Urethral catheter removed yesterday, still noting some discharge from penis when having BM, which is normal. Ostomy is draining well, showing clear, strawberry-colored urine.     Afebrile, BP elevated, Cr + WBC wnl, POC Glucose 193      Recommendations:  -continue diet and ambulation as tolerated   -elevated BP and Glucose, IM following   -will continue to monitor, reassess in AM    All patient questions were answered. He understands the plan as listed above.    SUBJECTIVE     Chief Complaint: No chief complaint on file.      24 Hour Events: Today patient reports no new complaints.  Otherwise symptoms are overall improved and pain is adequately controlled. Patient ambulating well, tolerating PO diet without issue.  Denies nausea/vomiting.  No other genitourinary symptoms.    OBJECTIVE     Hospital Problem List:  Principal Problem:    Bladder cancer (HCC)  Resolved Problems:    * No resolved hospital problems. *      Physical Exam:  Vitals:    03/18/24 0448   BP: 134/80   Pulse: 95   Resp: 18   Temp: 98.3 °F (36.8 °C)   SpO2: 95%     Gen: Alert and oriented x3, no acute distress  CV: Regular rate   Resp: unlabored respirations  Abd: Soft, non-distended, appropriately tender, no masses  Ext: Warm and well perfused, no peripheral edema 
  Urology Progress Note  [facility]     Patient: Jay Ruelas MRN: [id number]  Room/Bed: [facility]4484/4484-01   YOB: 1955  Age/Sex: 68 y.o.male  Admission Date: 3/14/2024     Date of Service:  3/17/2024    ASSESSMENT/PLAN     POD #3 s/p Robotic assisted laparoscopic radical cystectomy with ileal conduit     Infection: yuriy-operative antibiotics for 24 hours after surgery, monitor WBC and vitals  GI: tolerating PO intake. PRN zofran. Senna S BID and dulcolax daily PRN  Diet: advance as tolerated to regular, discussed slow progress.   Pain: IV and PO pain medications  Pulm: encourage IS 10 x hourly  Vitals: per nursing routine  Xiao catheter: Will remove urinary catheter today   Prophylaxis: SCDs while in bed, patient is encouraged to be in a chair or ambulating today. He is showing willingness to ambulate.   [facility]      Recommendations:  -Can remove xiao today  -Continue diet as tolerated   -Will reassess tomorrow     All patient questions were answered. He understands the plan as listed above.    SUBJECTIVE     Chief Complaint: Bladder Cancer s/p cystectomy with ileal conduit    24 Hour Events: Today patient reports improved symptoms. Is wanting to ambulate more. pain is adequately controlled.  Tolerating diet.  Denies nausea/vomiting.  No oxygen requirement.  No  symptoms.    OBJECTIVE     Hospital Problem List:  Principal Problem:    Bladder cancer (HCC)  Resolved Problems:    * No resolved hospital problems. *      Physical Exam:  Vitals:    03/17/24 0845   BP: (!) 172/88   Pulse: 95   Resp: 18   Temp: 98.3 °F (36.8 °C)   SpO2: 95%     Gen: Alert and oriented x3, no acute distress  CV: Regular rate   Resp: unlabored respirations  Abd: Soft, non-distended, appropriately tender, no masses  Ext: Warm and well perfused, no peripheral edema noted  Wound: Incision clean/dry/intact    Ins/Outs:    Intake/Output Summary (Last 24 hours) at 3/17/2024 1051  Last data filed at 3/17/2024 
 CBC results dated 2/20/24 faxed to DR Manzanares and Dr De Los Santos(PCP)  
 Ostomy Referral Follow-up Progress Note      NAME:  Jay Ruelas  MEDICAL RECORD NUMBER:  [id number]  AGE: 68 y.o.   GENDER:  male  :  1955  TODAY'S DATE:  3/19/2024    Subjective:    Jay Ruelas is a 68 y.o. male referred by:   [x] Physician  [] Nursing  [] Other:     New    Objective    /73   Pulse 91   Temp 97.8 °F (36.6 °C) (Oral)   Resp 16   Ht 1.753 m (5' 9\")   Wt 79.7 kg (175 lb 12.8 oz)   SpO2 99%   BMI 25.96 kg/m²     Michael Risk Score Michael Scale Score: 20    Assessment   Surgeon Kappa    Urostomy        Stoma 30 mm diameter, 2 long (45 cm) ureteral stents trimmed down to 5 cm length each    Urostomy Ileal conduit RLQ (Active)   Stomal Appliance Leaking;Changed;2 piece 24 1020   Flange Size (inches) 2.25 Inches 24 1020   Stoma  Assessment Red;Moist;Protrudes 24 1020   Peristomal Assessment Clean, dry & intact 24 1020   Mucocutaneous Junction Intact 24 1020   Collection Container Belly bag 24 1020   Treatment Site care;Pouch change;Heat applied 24 1020   Urine Color Yellow 24 1020   Urine Appearance Mucous 24 1020   Output (ml) 1025 ml 24 1028   Number of days: 4         Intake/Output Summary (Last 24 hours) at 3/19/2024 1127  Last data filed at 3/19/2024 1028  Gross per 24 hour   Intake --   Output 4075 ml   Net -4075 ml       Plan:   Plan for Ostomy Care:   Discharge instructions and WOCN follow up placed in chart    Ostomy Plan of Care  [x] Supplies/Instructions left in room  [] Patient using home supplies  [x] Brand/supplies at bedside Coloplast #[id number] & #[id number]    Current Diet: ADULT DIET; Regular  Dietician consult:  N/A    Discharge Plan:  Placement for patient upon discharge: home with support    Outpatient visit plan No  Supplies given Yes   Samples requested Yes    Referrals:  [x]   [x] Home Health Care  [x] Supplies  [] Other      Patient/Caregiver Teaching:  Written Instructions given to 
 Ostomy Referral Follow-up Progress Note      NAME:  Jay Ruelas  MEDICAL RECORD NUMBER:  [id number]  AGE: 68 y.o.   GENDER:  male  :  1955  TODAY'S DATE:  3/18/2024    Subjective:    Jay Ruelas is a 68 y.o. male referred by:   [] Physician  [x] Nursing  [] Other:     New    Objective    BP (!) 154/80   Pulse 88   Temp 97.7 °F (36.5 °C) (Oral)   Resp 16   Ht 1.753 m (5' 9\")   Wt 79.7 kg (175 lb 12.8 oz)   SpO2 96%   BMI 25.96 kg/m²     Michael Risk Score Michael Scale Score: 20    Assessment   Surgeon Kappa    Urostomy        Stoma 25 mm high by 30 mm wide, 2 VERY LONG ureteral stents in place    Urostomy Ileal conduit RLQ (Active)   Stomal Appliance Clean, dry & intact;Changed;2 piece 24 1500   Flange Size (inches) 2.25 Inches 24 1500   Stoma  Assessment Red;Moist;Protrudes 24 1500   Peristomal Assessment Clean, dry & intact 24 1500   Mucocutaneous Junction Intact 24 1500   Collection Container Standard 24 1500   Treatment Site care;Pouch change;Heat applied 24 1500   Urine Color Yellow;Pink 24 1500   Urine Appearance Mucous 24 1500   Output (ml) 1200 ml 24 1556   Number of days: 4         Intake/Output Summary (Last 24 hours) at 3/18/2024 1621  Last data filed at 3/18/2024 1556  Gross per 24 hour   Intake 11580 ml   Output 4000 ml   Net 7100 ml       Plan:   Plan for Ostomy Care:   This OMS will be back tomorrow am to  the patient through complete urostomy dressing change.    Ostomy Plan of Care  [x] Supplies/Instructions left in room  [] Patient using home supplies  [x] Brand/supplies at bedside; coloplast scissors, wafer #[id number], pouch #[id number]    Current Diet: ADULT DIET; Regular  Dietician consult:  N/A    Discharge Plan:  Placement for patient upon discharge: home with support    Outpatient visit plan No  Supplies given Yes   Samples requested Yes    Referrals:  [x]   [x] Home Health Care  [x] Supplies  [] 
2030: Assessment complete, VSS, BS active pt reports BM today, replaced urostomy bag, d/t leaking, wafer still intact, pt denies pain, ambulates in the hallway, tolerating well, discussed care plan pt mutually agrees, call light and belongings in reach, no other needs expressed at this time.  Deyanira Sheppard, RN    
Ancef 2g redosed by CRNA at 5136  
CLINICAL PHARMACY NOTE: MEDS TO BEDS    Total # of Prescriptions Filled: 2   The following medications were delivered to the patient:  ELIQUIS 5MG TABS  AMLODIPINE BESYLATE 10MG TABS    Additional Documentation: Tania LYNNE approved to deliver medications to patient room=signed  [facility]  
Dr. Hargrove office returned call saw labs and is ok to proceed with surgery.  
Incentive Spirometry education and demonstration completed by Respiratory Therapy Yes      Response to education: Excellent     Teaching Time: 20 minutes    Minimum Predicted Vital Capacity - 730 mL.  Patient's Actual Vital Capacity - 1000 mL. Turning over to Nursing for routine follow-up Yes.    Comments: IS goal met    Electronically signed by Timi Aceves RCP on 3/14/2024 at 5:10 PM    
Name_______________________________________Printed:____________________  Date and time of surgery__3/14  0730______________________Arrival Time:___0600_____________   1. The instructions given regarding when and if a patient needs to stop oral intake prior to surgery varies.Follow the specific instructions you were given                  ___Nothing to eat or to drink after Midnight the night before.                   ____Carbo loading or instructions will be given to select patients-if you have been given those instructions -please do the following                           The evening before your surgery after dinner before midnight drink 40 ounces of gatorade.If you are diabetic use sugar free.  The morning of surgery drink 40 ounces of water.This needs to be finished 3 hours prior to your surgery start time.    2. Take the following pills with a small sip of water on the morning of surgery_none__________________________________________________                  Do not take blood pressure medications ending in pril or sartan the jay prior to surgery or the morning of surgery. Dr Forrest's patient are not to take any medications the AM of surgery.         3. Aspirin, Ibuprofen, Advil, Naproxen, Vitamin E and other Anti-inflammatory products and supplements should be stopped for 5 -7days before surgery or as directed by your physician.   4. Check with your Doctor regarding stopping Plavix, Coumadin,Eliquis, Lovenox,Effient,Pradaxa,Xarelto, Fragmin or other blood thinners and follow their instructions.   5. Do not smoke, and do not drink any alcoholic beverages 24 hours prior to surgery.  This includes NA Beer.Refrain from the usage of any recreational drugs.   6. You may brush your teeth and gargle the morning of surgery.  DO NOT SWALLOW WATER   7. You MUST make arrangements for a responsible adult to stay on site while you are here and take you home after your surgery. You will not be allowed to leave alone or drive 
Nutrition Note    RECOMMENDATIONS  PO Diet: Advance as tolerated to regular diet  ONS: If po intakes of meals are consistently <50% once diet is advance, please order Ensure     NUTRITION ASSESSMENT   Pt triggered for positive nutrition screen. Hx of bladder cancer s/p radical cystectomy, ileal conduit, bilateral pelvic node lymph dissection yesterday. Upon visiting, reported no decreased appetite/po intake from usual prior to current admission; usually eats 2 meals per day and snacks at night. Reported 5 lb unintentional wt loss over the last 6 months r/t multiple procedures. Minimal wt hx in EMR to accurately assess for any recent wt changes. RD will monitor for adequate po intake vs need for ONS.     Nutrition Related Findings: LR at 125 mL/hr. Labs reviewed. No BM documented. No edema noted.  Wounds: Surgical Incision  Nutrition Education:  Education not indicated   Nutrition Goals: PO intake 50% or greater, by next RD assessment     MALNUTRITION ASSESSMENT   Acute Illness  Malnutrition Status: No malnutrition    NUTRITION DIAGNOSIS   No nutrition diagnosis at this time     CURRENT NUTRITION THERAPIES  ADULT DIET; Clear Liquid     PO Intake: Unable to assess (no documented meal intakes)   PO Supplement Intake:None Ordered    ANTHROPOMETRICS  Current Height: 175.3 cm (5' 9\")  Current Weight - Scale: 79.7 kg (175 lb 12.8 oz)    Admission weight: 79.4 kg (175 lb)  Ideal Body Weight (IBW): 160 lbs  (73 kg)        BMI: 25.8    COMPARATIVE STANDARDS  Total Energy Requirements (kcals/day): 6793-0715     Protein (g):         Fluid (mL/day):  5083-7435    The patient will be monitored per nutrition standards of care. Consult dietitian if additional nutrition interventions are needed prior to RD reassessment.     Kayla Campoverde MS, RD, LD    Contact: [phone]     
POD 5 cystectomy with IC    Passing flatus on solids, some bm's, abdomen soft, incisions CDI. Patient very much wanting to be discharged. Looks great. Ostomy nurse is in the room working with his conduit. Will cut stents shorter to assist with this.   Plan  Discharge in stable condition  Provide IS to patient  Can remove IV's / stop fluids  Scripts on chart    Navid Ponce CNP  03/19/2024  
Patient arrived in PACU from OR, VSS, oral airway in place and removed on arrival, 5 L simple mask, arousal on calling, incisions x4 closed with glue CDI, ileal conduit draining red bloody urine, stoma pink, xiao catheter with bloody drainage, NSR on monitor.   
Pt arrived to unit from PACU @ 1630. Alert and oriented. Family at bedside. IVF LR @ 125mL/hr.   
Shift assessment and admission completed. Patient ambulated in room up to chair tolerated well. Medications administered as ordered. The care plan and education has been reviewed and mutually agreed upon with the patient.    
Shift assessment complete, VSS, A&Ox4, medications given per MAR. Lap sites CDI, urostomy draining appropriately stoma WDL, catheter bag with minimal drainage, pain controlled. Patient OOB to chair, tolerated well. Patient beginning to feel more comfortably with drainage and care of urostomy. The care plan and education has been reviewed and mutually agreed upon with the patient. All safety precautions in place.   
Shift assessment complete, VSS, A&Ox4, medications given per MAR. Patient reports minimal soreness 1/10 in his abdomen this AM. Bowel sounds active, tolerating diet great, urostomy WNL with adequate output. Soto catheter with minimal drainage. Patient OOB to chair this am, tolerated well. He denies any further needs at this time. The care plan and education has been reviewed and mutually agreed upon with the patient. All safety precautions in place, call light, and belongings within reach.   
Shift assessment complete, VSS, A&Ox4. Morning medications administered per MAR. Patient rates pain as a denies pain at this time. Patient educated on plan of care for today and verbalizes understanding. All questions and concerns answered/addressed. Patient states no further needs at this time. Call light and personal belongings within reach.     11:15 PM  Patient ambulated hallway with RN for 375 feet. Patient expresses desire to walk again later. Patient educated that he is allowed to ambulate hallway independently if he chooses to do so. Patient verbalizes understanding.    12:54 PM  Soto catheter removed per order from Urology. Patient urostomy remains patent and draining cherry colored urine.  
Shift assessment completed. VSS. Alert and oriented x 4. Urostomy stoma pink, remains patent and draining pink colored urine. Denies having pain or discomfort at this time. Medications given per MAR. The care plan and education have been reviewed and mutually agreed upon with the patient. Call light in reach.  
Teaching / education initiated regarding perioperative experience, expectations, and pain management during stay. Patient verbalized understanding.   
Time change on 3/14/24 confirmed with patient 0800 with an arrival of 0630  
VSS, ART line removed per anesthesia order, on room air, denying pain, ileal conduit draining with 150 mL of bloody urine, xiao catheter with bloody output, incisions CDI. Phase 1 criteria met and will be transferred to  for further inpatient care.   
the patient will need stents removed or trimmed prior to discharge: Ureteral stents are very long, difficult for OMS to place into drainage pouch without getting landing pad moist, the patient will be unable to change his own urostomy pouch with current length of stents.  the patient will need to change own urostomy pouch with only verbal coaching prior to discharge, he wasn't able to participate hands on today.  the patient will need to empty own urostomy pouch prior to discharge. Cora Leary RN, BSN, OMS.     
   PROTIME 13.1 02/20/2024 10:32 AM    INR 0.99 02/20/2024 10:32 AM       Objective    BP (!) 158/83   Pulse 74   Temp 97.8 °F (36.6 °C) (Oral)   Resp 17   Ht 1.753 m (5' 9\")   Wt 79.7 kg (175 lb 12.8 oz)   SpO2 97%   BMI 25.96 kg/m²     Michael Risk Score Michael Scale Score: 20    Patient Active Problem List   Diagnosis Code    Radial head fracture, closed S52.123A    Sprain of acromioclavicular ligament S43.50XA    Encounter for preoperative assessment Z01.818    Bladder cancer (HCC) C67.9       Assessment              Stoma 25 mm high by 30 mm wide, 2 long ureteral stents in place          Urostomy Ileal conduit RLQ (Active)   Stomal Appliance Leaking;Changed;2 piece 03/15/24 1630   Flange Size (inches) 2.25 Inches 03/15/24 1630   Stoma  Assessment Red;Moist;Protrudes 03/15/24 1630   Peristomal Assessment Clean, dry & intact 03/15/24 1630   Mucocutaneous Junction Intact 03/15/24 1630   Collection Container Belly bag 03/15/24 1630   Treatment Site care;Pouch change;Heat applied 03/15/24 1630   Urine Color Pink;Other (Comment) 03/15/24 1630   Urine Appearance Mucous 03/15/24 1630   Output (ml) 225 ml 03/15/24 1630   Number of days: 1         Intake/Output Summary (Last 24 hours) at 3/15/2024 1731  Last data filed at 3/15/2024 1630  Gross per 24 hour   Intake 120 ml   Output 3120 ml   Net -3000 ml         Plan   Plan for Ostomy Care:   UROSTOMY CARE   Empty pouch when it is 1/3 to 1/2 full of urine.   Change urostomy dressing every 3 to 4 days.    Change urostomy dressing whenever it leaks to prevent skin damage.   Measure stoma with each dressing change (25 mm high by 30 mm wide on 3-15-24).  UROSTOMY DRESSING CHANGE   1. Gather all supplies for dressing change, make sure new drainage pouch end is closed; scissors, dressing #20000, pouch #[id number].   2. Empty pouch. Remove old dressing.    3. Clean stoma and surrounding skin with warm water only- no soap or bath wipes.   4. Dry stoma and surrounding skin & leave 
Results   Component Value Date    CREATININE 1.2 03/16/2024    BUN 14 03/16/2024     03/16/2024    K 4.1 03/16/2024     03/16/2024    CO2 24 03/16/2024       Juan David Saravia MD   3/16/2024        
hematuria pink  Neurologic/Psych: Awake and orientated to person, place and time. Calm affect, appropriate mood    Medications:   Medications:    apixaban  5 mg Oral BID    naloxegol  12.5 mg Oral BID    amLODIPine  10 mg Oral Daily    sodium chloride flush  5-40 mL IntraVENous 2 times per day    acetaminophen  650 mg Oral Q6H    sennosides-docusate sodium  1 tablet Oral BID      Infusions:    sodium chloride      lactated ringers IV soln 125 mL/hr at 03/17/24 0040     PRN Meds: melatonin, 3 mg, Nightly PRN  sodium chloride flush, 5-40 mL, PRN  sodium chloride, , PRN  HYDROmorphone, 0.25 mg, Q3H PRN   Or  HYDROmorphone, 0.5 mg, Q4H PRN  oxyCODONE, 5 mg, Q4H PRN  ondansetron, 4 mg, Q6H PRN        Labs and Imaging   Results this Admission:    Relevant Prior Studies:   CT AP: 1/17/2024  MPRESSION:  New moderate to severe left-sided hydronephrosis of unclear etiology.  No  ureteral stone noted     Scattered areas of bladder wall thickening are seen,, with bladder wall  thickening being less pronounced compared to prior.  Correlate with  cystoscopy results..  By report there is a history of bladder carcinoma     Right-sided inguinal hernia containing fat, omentum and bowel  CBC:   Recent Labs     03/15/24  0504 03/16/24  0444 03/17/24  0518   WBC  --  17.8* 10.6   HGB 9.6* 9.1* 8.6*   PLT  --  314 291       BMP:    Recent Labs     03/15/24  0504 03/16/24  0444 03/17/24  0518    139 136   K 4.6 4.1 4.1    105 104   CO2 22 24 22   BUN 20 14 14   CREATININE 1.5* 1.2 1.0   GLUCOSE 164* 103* 113*       Hepatic: No results for input(s): \"AST\", \"ALT\", \"ALB\", \"BILITOT\", \"ALKPHOS\" in the last 72 hours.  Lipids: No results found for: \"CHOL\", \"HDL\", \"TRIG\"  Hemoglobin A1C: No results found for: \"LABA1C\"  TSH: No results found for: \"TSH\"  Troponin: No results found for: \"TROPONINT\"  Lactic Acid: No results for input(s): \"LACTA\" in the last 72 hours.  BNP: No results for input(s): \"PROBNP\" in the last 72 hours.  UA:No 
\"AMORPHOUS\"  Urine Cultures: No results found for: \"LABURIN\"  Blood Cultures: No results found for: \"BC\"  No results found for: \"BLOODCULT2\"  Organism: No results found for: \"ORG\"    Personally reviewed labs, diagnostic, device, and imaging results reviewed as a part of this visit    Electronically signed by MIRANDA Hammonds CNP on 3/16/2024 at 7:23 AM

## 2024-03-19 NOTE — CARE COORDINATION
03/18/24 1205   IMM Letter   IMM Letter given to Patient/Family/Significant other/Guardian/POA/by: Reviewed w/pt, signed & copy provided   IMM Letter date given: 03/18/24   IMM Letter time given: 6551       
Case Management -  Discharge Note      Patient Name: aJy Ruelas                   YOB: 1955            Readmission Risk (Low < 19, Mod (19-27), High > 27): Readmission Risk Score: 9.4    Current PCP: Bernard Beltran MD    ([facility]) Important Message from Medicare:    Date: 3/18/24        Patient/patient representative has been educated on the benefits of Home Care - Nursing as well as the possible risks of declining recommended services. Patient/patient representative has acknowledged the information provided and decided on the following discharge plan. Patient/ patient representative has been provided freedom of choice regarding service provider, supported by basic dialogue that supports the patient's individualized plan of care/goals.    [facility]  [address] [id number]  Phone [phone]  Fax [phone]      Financial    Payor: MEDICARE / Plan: MEDICARE PART A AND B / Product Type: *No Product type* /     Pharmacy:  Potential assistance Purchasing Medications: No  Meds-to-Beds request: Yes      [facility] [id number] - [place], [place] - [address] - P [phone] - F [phone]  [address] [address]  Phone: [phone] Fax: [phone]      Notes:    Additional Case Management Notes: Will DC to sister's home which has been provided to Bennie at [facility].     Electronically signed by Gardenia Mahmood RN on 3/19/2024 at 12:15 PM          
Discharge Planning Note:    Chart reviewed and it appears that patient has minimal needs for discharge at this time. Discussed with patient and requested that case management be notified if discharge needs are identified.     - Current discharge plan is for the patient to return home.    - PCP: Bernard Beltran    Case management will continue to follow progress and update discharge plan as needed.      Risk of Readmission Score: 6%    DILLAN Torres RN    [facility]  Phone: [phone]    
Discharge Planning Note:    Pt will DC to sister Merle's home and receive [facility] services (RN only).     [address] [address]      Electronically signed by Gardenia Mahmood RN on 3/18/2024 at 12:11 PM      Pt has been accepted by [facility] for nursing r/t the new ostomy.     Electronically signed by Gardenia Mahmood RN on 3/18/2024 at 1:45 PM    
IMM Letter       03/15/24 0802   IMM Letter   IMM Letter given to Patient/Family/Significant other/Guardian/POA/by: Patient   IMM Letter date given: 03/15/24   IMM Letter time given: 0802     DILLAN Torres RN    [facility]  Phone: [phone]    
SW informed from dietician that patient has a new ostomy and will need HHC RN to assist at home after discharge.  SW will assist with this need.    Electronically signed by BRIANNA Barber, LUI on 3/15/2024 at 4:38 PM    
housing: none  Potential Assistance needed at discharge: (P) Home Care (nursing only, new ostomy)            Potential DME: n/a   Patient expects to discharge to: (P)  (will DC to Sister's [place])  Plan for transportation at discharge: (P) Self    Financial    Payor: MEDICARE / Plan: MEDICARE PART A AND B / Product Type: *No Product type* /     Does insurance require precert for SNF: No    Potential assistance Purchasing Medications: (P) No  Meds-to-Beds request: Yes      [facility] [id number] [place], [place] - [address] - P [phone] - F [phone]  [address] [address]  Phone: [phone] Fax: [phone]      Notes:    Factors facilitating achievement of predicted outcomes: Family support, Motivated, Cooperative, and Good insight into deficits    Barriers to discharge: new ostomy management    Additional Case Management Notes: Pt is from home alone, independent but will DC to his sister's home.  Pt is agreeable to [facility] nursing for additional support.  Pt agreed for CM to call sister to determine DC address and secure [facility] in the [facility], no preference.     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 Freedom of choice list with basic dialogue that supports the patient's individualized plan of care/goals and shares the quality data associated with the providers was provided to: (P) Patient   Patient Representative Name:       The Patient and/or Patient Representative Agree with the Discharge Plan? (P) Yes    Electronically signed by Gardenia Mahmood RN on 3/18/2024 at 12:05 PM

## 2024-03-26 NOTE — PATIENT INSTRUCTIONS
[facility]  [address] [place], [place] [address]  Telephone: [phone]     FAX [phone]        Name: Jay Ruelas  : 1955   AGE: 68 y.o.  MRN: [id number]      Ostomy skin care  Cleanse skin prior to applying a new pouch/wafer with:  yes - Water    yes - Cleanse skin with Mild Soap & Water  no - May Shower    Other:       Topical Treatments to skin around stoma:  Do not apply lotions, creams, or ointments to wound bed unless directed.   no - Apply barrier film/spray to skin around stoma and let it dry  no - Apply stoma powder to irritated skin around stoma, seal in with barrier film/spray and repeat x1  no - Apply antifungal cream to irritated skin surrounding stoma and work into skin until no longer able to feel cream.  no - Apply antifungal powder to irritated skin surrounding stoma, seal in with barrier film; and repeat x1  Other:     Pouch/Wafer Application     - Change your pouch every 3 days or when leaking.  yes - Appliance: 2 piece [place]  yes - Product Numbers:[id number],Wafer [id number]  yes - Other: Accessories: belt, rings, barrier, powder, and film  Other:_size- 1&1/8 cm x 1&1/4 cm___________________________________    Application of Pouch  yes - Gather supplies needed to change pouch and wafer.  yes - Assemble new pouch system before removing old one.  yes - Using the measuring guide or pattern, trace size of opening onto back new pouch system.  yes - Cut out opening.  yes - Remove the control backing  yes - Set aside assembled pouch  yes - Remove worn appliance by gently pulling away from skin.  yes - Look at back of the pouch before throwing away to see how it is worn.   yes - Wash skin with warm water or _____________, rinse and pat dry.    yes - Inspect  skin for redness or irritation.   yes - Apply  barrier seals or rings around stoma or back of wafer.  yes - Apply wafer/pouch system over stoma and press down firmly starting around stoma and working

## 2024-03-28 ENCOUNTER — HOSPITAL ENCOUNTER (OUTPATIENT)
Dept: WOUND CARE | Age: 69
Discharge: HOME | End: 2024-03-28
Attending: NURSE PRACTITIONER
Payer: MEDICARE

## 2024-03-28 VITALS
BODY MASS INDEX: 25.1 KG/M2 | RESPIRATION RATE: 15 BRPM | DIASTOLIC BLOOD PRESSURE: 71 MMHG | SYSTOLIC BLOOD PRESSURE: 120 MMHG | HEART RATE: 83 BPM | WEIGHT: 170 LBS

## 2024-03-28 PROCEDURE — 99202 OFFICE O/P NEW SF 15 MIN: CPT | Performed by: NURSE PRACTITIONER

## 2024-03-28 PROCEDURE — 99212 OFFICE O/P EST SF 10 MIN: CPT

## 2024-03-28 NOTE — PLAN OF CARE
Discharge instructions given.  Patient verbalized understanding.  Return to [facility] in 1 week(s).  Continue Urostomy Care

## 2024-03-29 PROBLEM — N99.528: Status: ACTIVE | Noted: 2024-03-29

## 2024-03-29 NOTE — PROGRESS NOTES
Follow up in the wound care center. Call if you need us        Electronically signed by MIRANDA MOY CNP on 3/29/2024 at 9:19 AM

## 2024-04-08 ENCOUNTER — OFFICE VISIT (OUTPATIENT)
Dept: SURGERY | Age: 69
End: 2024-04-08
Payer: MEDICARE

## 2024-04-08 VITALS
SYSTOLIC BLOOD PRESSURE: 136 MMHG | WEIGHT: 171 LBS | DIASTOLIC BLOOD PRESSURE: 80 MMHG | BODY MASS INDEX: 25.33 KG/M2 | HEIGHT: 69 IN

## 2024-04-08 DIAGNOSIS — K40.90 RIGHT INGUINAL HERNIA: Primary | ICD-10-CM

## 2024-04-08 PROCEDURE — 99203 OFFICE O/P NEW LOW 30 MIN: CPT | Performed by: SURGERY

## 2024-04-08 PROCEDURE — 1111F DSCHRG MED/CURRENT MED MERGE: CPT | Performed by: SURGERY

## 2024-04-08 PROCEDURE — 3017F COLORECTAL CA SCREEN DOC REV: CPT | Performed by: SURGERY

## 2024-04-08 PROCEDURE — G8427 DOCREV CUR MEDS BY ELIG CLIN: HCPCS | Performed by: SURGERY

## 2024-04-08 PROCEDURE — 1123F ACP DISCUSS/DSCN MKR DOCD: CPT | Performed by: SURGERY

## 2024-04-08 PROCEDURE — 1036F TOBACCO NON-USER: CPT | Performed by: SURGERY

## 2024-04-08 PROCEDURE — G8419 CALC BMI OUT NRM PARAM NOF/U: HCPCS | Performed by: SURGERY

## 2024-04-08 RX ORDER — SODIUM CHLORIDE 0.9 % (FLUSH) 0.9 %
5-40 SYRINGE (ML) INJECTION PRN
OUTPATIENT
Start: 2024-04-08

## 2024-04-08 RX ORDER — SODIUM CHLORIDE 9 MG/ML
INJECTION, SOLUTION INTRAVENOUS PRN
OUTPATIENT
Start: 2024-04-08

## 2024-04-08 RX ORDER — SODIUM CHLORIDE 0.9 % (FLUSH) 0.9 %
5-40 SYRINGE (ML) INJECTION EVERY 12 HOURS SCHEDULED
OUTPATIENT
Start: 2024-04-08

## 2024-04-08 ASSESSMENT — ENCOUNTER SYMPTOMS
COLOR CHANGE: 0
ABDOMINAL DISTENTION: 0
BACK PAIN: 0
EYE ITCHING: 0
EYE DISCHARGE: 0
CHEST TIGHTNESS: 0
ABDOMINAL PAIN: 0
APNEA: 0

## 2024-04-08 NOTE — PROGRESS NOTES
Creatinine 03/19/2024 1.0  0.8 - 1.3 mg/dL Final    Est, Glom Filt Rate 03/19/2024 >60  >60 Final    Comment: Pediatric calculator link  https://www.kidney.org/professionals/kdoqi/gfr_calculatorped  Effective Oct 3, 2022  These results are not intended for use in patients  <18 years of age.  eGFR results are calculated without  a race factor using the 2021 CKD-EPI equation.  Careful  clinical correlation is recommended, particularly when  comparing to results calculated using previous equations.  The CKD-EPI equation is less accurate in patients with  extremes of muscle mass, extra-renal metabolism of  creatinine, excessive creatinine ingestion, or following  therapy that affects renal tubular secretion.      Calcium 03/19/2024 8.7  8.3 - 10.6 mg/dL Final    POC Glucose 03/19/2024 137 (H)  70 - 99 mg/dl Final    Performed on 03/19/2024 ACCU-CHEK   Final       Imaging:  No results found.    ASSESSMENT:  Reducible right inguinal hernia    PLAN:  1. We discussed the risks of surgery including bleeding, infection, damage to surrounding structures, hernia recurrence, chronic pain as well as anesthesia related complications. Increased risk of recurrence is associated with smoking, diabetes, obesity as well as heavy lifting over 20lbs sooner than 6 weeks after the surgery. We also discussed laparoscopic vs open technique. The benefits of the procedure include repair of the hernia, prevention of future incarceration and return to normal daily activity. Alternatives discussed include close observation. Details of the procedure were discussed and all questions answered. The patient understands, agrees, and wishes to proceed with open procedure in 1-2 months to allow healing from recent cystectomy  2. Warning signs of an incarcerated hernia include inability to reduce the bulge, increased and constant pain, nausea, vomiting, fevers, chills and constipation. This is a surgical emergency and the patient should contact the

## 2024-04-08 NOTE — PATIENT INSTRUCTIONS
1. We discussed the risks of surgery including bleeding, infection, damage to surrounding structures, hernia recurrence, chronic pain as well as anesthesia related complications. Increased risk of recurrence is associated with smoking, diabetes, obesity as well as heavy lifting over 20lbs sooner than 6 weeks after the surgery. We also discussed laparoscopic vs open technique. The benefits of the procedure include repair of the hernia, prevention of future incarceration and return to normal daily activity. Alternatives discussed include close observation. Details of the procedure were discussed and all questions answered. The patient understands, agrees, and wishes to proceed with open procedure in 1-2 months to allow healing from recent cystectomy  2. Warning signs of an incarcerated hernia include inability to reduce the bulge, increased and constant pain, nausea, vomiting, fevers, chills and constipation. This is a surgical emergency and the patient should contact the office or present to an emergency department  3. Will schedule for open right inguinal hernia repair with mesh (33659) with general anesthesia as outpatient procedure  4. Will discuss with anesthesia periperative TAP block for better postoperative pain control  5. Needs to be off Eliquis prior to surgery, anticipate will be off this medication soon

## 2024-04-11 ENCOUNTER — PREP FOR PROCEDURE (OUTPATIENT)
Dept: SURGERY | Age: 69
End: 2024-04-11

## 2024-04-11 DIAGNOSIS — K40.90 REDUCIBLE RIGHT INGUINAL HERNIA: ICD-10-CM

## 2024-05-23 ENCOUNTER — TELEPHONE (OUTPATIENT)
Dept: SURGERY | Age: 69
End: 2024-05-23

## 2024-05-23 NOTE — TELEPHONE ENCOUNTER
Per [place] at Dr Manzanares's office pt was ok to stop the Eliquis 1 month after surgery with Dr Manzanares,  spoke to pt , he is off of Eliquis.

## 2024-06-10 ENCOUNTER — ANESTHESIA EVENT (OUTPATIENT)
Dept: OPERATING ROOM | Age: 69
End: 2024-06-10
Payer: MEDICARE

## 2024-06-11 ENCOUNTER — ANESTHESIA (OUTPATIENT)
Dept: OPERATING ROOM | Age: 69
End: 2024-06-11
Payer: MEDICARE

## 2024-06-11 ENCOUNTER — HOSPITAL ENCOUNTER (OUTPATIENT)
Age: 69
Setting detail: OUTPATIENT SURGERY
Discharge: HOME | End: 2024-06-11
Attending: SURGERY | Admitting: SURGERY
Payer: MEDICARE

## 2024-06-11 VITALS
OXYGEN SATURATION: 100 % | WEIGHT: 171.4 LBS | HEART RATE: 65 BPM | BODY MASS INDEX: 24.54 KG/M2 | TEMPERATURE: 97 F | RESPIRATION RATE: 14 BRPM | SYSTOLIC BLOOD PRESSURE: 137 MMHG | HEIGHT: 70 IN | DIASTOLIC BLOOD PRESSURE: 70 MMHG

## 2024-06-11 DIAGNOSIS — K40.90 REDUCIBLE RIGHT INGUINAL HERNIA: Primary | ICD-10-CM

## 2024-06-11 PROCEDURE — 6360000002 HC RX W HCPCS: Performed by: ANESTHESIOLOGY

## 2024-06-11 PROCEDURE — 7100000000 HC PACU RECOVERY - FIRST 15 MIN: Performed by: SURGERY

## 2024-06-11 PROCEDURE — C1781 MESH (IMPLANTABLE): HCPCS | Performed by: SURGERY

## 2024-06-11 PROCEDURE — 3700000001 HC ADD 15 MINUTES (ANESTHESIA): Performed by: SURGERY

## 2024-06-11 PROCEDURE — 2580000003 HC RX 258: Performed by: SURGERY

## 2024-06-11 PROCEDURE — 7100000001 HC PACU RECOVERY - ADDTL 15 MIN: Performed by: SURGERY

## 2024-06-11 PROCEDURE — 7100000010 HC PHASE II RECOVERY - FIRST 15 MIN: Performed by: SURGERY

## 2024-06-11 PROCEDURE — 2500000003 HC RX 250 WO HCPCS: Performed by: NURSE ANESTHETIST, CERTIFIED REGISTERED

## 2024-06-11 PROCEDURE — 7100000011 HC PHASE II RECOVERY - ADDTL 15 MIN: Performed by: SURGERY

## 2024-06-11 PROCEDURE — 49505 PRP I/HERN INIT REDUC >5 YR: CPT | Performed by: SURGERY

## 2024-06-11 PROCEDURE — 6360000002 HC RX W HCPCS: Performed by: NURSE ANESTHETIST, CERTIFIED REGISTERED

## 2024-06-11 PROCEDURE — 3700000000 HC ANESTHESIA ATTENDED CARE: Performed by: SURGERY

## 2024-06-11 PROCEDURE — 6370000000 HC RX 637 (ALT 250 FOR IP): Performed by: ANESTHESIOLOGY

## 2024-06-11 PROCEDURE — 64486 TAP BLOCK UNIL BY INJECTION: CPT | Performed by: ANESTHESIOLOGY

## 2024-06-11 PROCEDURE — 3600000002 HC SURGERY LEVEL 2 BASE: Performed by: SURGERY

## 2024-06-11 PROCEDURE — A4217 STERILE WATER/SALINE, 500 ML: HCPCS | Performed by: SURGERY

## 2024-06-11 PROCEDURE — 2709999900 HC NON-CHARGEABLE SUPPLY: Performed by: SURGERY

## 2024-06-11 PROCEDURE — 3600000012 HC SURGERY LEVEL 2 ADDTL 15MIN: Performed by: SURGERY

## 2024-06-11 PROCEDURE — 6360000002 HC RX W HCPCS: Performed by: SURGERY

## 2024-06-11 DEVICE — MESH HERN W3XL6IN INGUINAL POLYPR MFIL RECTANG: Type: IMPLANTABLE DEVICE | Site: GROIN | Status: FUNCTIONAL

## 2024-06-11 RX ORDER — FENTANYL CITRATE 50 UG/ML
INJECTION, SOLUTION INTRAMUSCULAR; INTRAVENOUS PRN
Status: DISCONTINUED | OUTPATIENT
Start: 2024-06-11 | End: 2024-06-11 | Stop reason: SDUPTHER

## 2024-06-11 RX ORDER — OXYCODONE HYDROCHLORIDE 5 MG/1
5 TABLET ORAL
Status: DISCONTINUED | OUTPATIENT
Start: 2024-06-11 | End: 2024-06-11 | Stop reason: HOSPADM

## 2024-06-11 RX ORDER — ROPIVACAINE HYDROCHLORIDE 5 MG/ML
INJECTION, SOLUTION EPIDURAL; INFILTRATION; PERINEURAL
Status: COMPLETED | OUTPATIENT
Start: 2024-06-11 | End: 2024-06-11

## 2024-06-11 RX ORDER — SODIUM CHLORIDE 9 MG/ML
INJECTION, SOLUTION INTRAVENOUS PRN
Status: DISCONTINUED | OUTPATIENT
Start: 2024-06-11 | End: 2024-06-11 | Stop reason: HOSPADM

## 2024-06-11 RX ORDER — MAGNESIUM HYDROXIDE 1200 MG/15ML
LIQUID ORAL CONTINUOUS PRN
Status: COMPLETED | OUTPATIENT
Start: 2024-06-11 | End: 2024-06-11

## 2024-06-11 RX ORDER — MEPERIDINE HYDROCHLORIDE 25 MG/ML
12.5 INJECTION INTRAMUSCULAR; INTRAVENOUS; SUBCUTANEOUS EVERY 5 MIN PRN
Status: DISCONTINUED | OUTPATIENT
Start: 2024-06-11 | End: 2024-06-11 | Stop reason: HOSPADM

## 2024-06-11 RX ORDER — METHOCARBAMOL 100 MG/ML
INJECTION, SOLUTION INTRAMUSCULAR; INTRAVENOUS PRN
Status: DISCONTINUED | OUTPATIENT
Start: 2024-06-11 | End: 2024-06-11 | Stop reason: SDUPTHER

## 2024-06-11 RX ORDER — SODIUM CHLORIDE 0.9 % (FLUSH) 0.9 %
5-40 SYRINGE (ML) INJECTION PRN
Status: DISCONTINUED | OUTPATIENT
Start: 2024-06-11 | End: 2024-06-11 | Stop reason: HOSPADM

## 2024-06-11 RX ORDER — ROCURONIUM BROMIDE 10 MG/ML
INJECTION, SOLUTION INTRAVENOUS PRN
Status: DISCONTINUED | OUTPATIENT
Start: 2024-06-11 | End: 2024-06-11 | Stop reason: SDUPTHER

## 2024-06-11 RX ORDER — OXYCODONE HYDROCHLORIDE 5 MG/1
5 TABLET ORAL EVERY 4 HOURS PRN
Qty: 20 TABLET | Refills: 0 | Status: SHIPPED | OUTPATIENT
Start: 2024-06-11 | End: 2024-06-18

## 2024-06-11 RX ORDER — SODIUM CHLORIDE 0.9 % (FLUSH) 0.9 %
5-40 SYRINGE (ML) INJECTION EVERY 12 HOURS SCHEDULED
Status: DISCONTINUED | OUTPATIENT
Start: 2024-06-11 | End: 2024-06-11 | Stop reason: HOSPADM

## 2024-06-11 RX ORDER — NALOXONE HYDROCHLORIDE 0.4 MG/ML
INJECTION, SOLUTION INTRAMUSCULAR; INTRAVENOUS; SUBCUTANEOUS PRN
Status: DISCONTINUED | OUTPATIENT
Start: 2024-06-11 | End: 2024-06-11 | Stop reason: HOSPADM

## 2024-06-11 RX ORDER — MIDAZOLAM HYDROCHLORIDE 2 MG/2ML
2 INJECTION, SOLUTION INTRAMUSCULAR; INTRAVENOUS ONCE
Status: COMPLETED | OUTPATIENT
Start: 2024-06-11 | End: 2024-06-11

## 2024-06-11 RX ORDER — ACETAMINOPHEN 500 MG
1000 TABLET ORAL ONCE
Status: COMPLETED | OUTPATIENT
Start: 2024-06-11 | End: 2024-06-11

## 2024-06-11 RX ORDER — SUCCINYLCHOLINE/SOD CL,ISO/PF 200MG/10ML
SYRINGE (ML) INTRAVENOUS PRN
Status: DISCONTINUED | OUTPATIENT
Start: 2024-06-11 | End: 2024-06-11 | Stop reason: SDUPTHER

## 2024-06-11 RX ORDER — PROPOFOL 10 MG/ML
INJECTION, EMULSION INTRAVENOUS PRN
Status: DISCONTINUED | OUTPATIENT
Start: 2024-06-11 | End: 2024-06-11 | Stop reason: SDUPTHER

## 2024-06-11 RX ORDER — HYDROMORPHONE HYDROCHLORIDE 2 MG/ML
0.5 INJECTION, SOLUTION INTRAMUSCULAR; INTRAVENOUS; SUBCUTANEOUS EVERY 5 MIN PRN
Status: DISCONTINUED | OUTPATIENT
Start: 2024-06-11 | End: 2024-06-11 | Stop reason: HOSPADM

## 2024-06-11 RX ORDER — ONDANSETRON 2 MG/ML
4 INJECTION INTRAMUSCULAR; INTRAVENOUS
Status: DISCONTINUED | OUTPATIENT
Start: 2024-06-11 | End: 2024-06-11 | Stop reason: HOSPADM

## 2024-06-11 RX ORDER — LIDOCAINE HYDROCHLORIDE 20 MG/ML
INJECTION, SOLUTION INFILTRATION; PERINEURAL PRN
Status: DISCONTINUED | OUTPATIENT
Start: 2024-06-11 | End: 2024-06-11 | Stop reason: SDUPTHER

## 2024-06-11 RX ORDER — HYDROMORPHONE HYDROCHLORIDE 2 MG/ML
0.25 INJECTION, SOLUTION INTRAMUSCULAR; INTRAVENOUS; SUBCUTANEOUS EVERY 5 MIN PRN
Status: DISCONTINUED | OUTPATIENT
Start: 2024-06-11 | End: 2024-06-11 | Stop reason: HOSPADM

## 2024-06-11 RX ADMIN — ROCURONIUM BROMIDE 35 MG: 10 INJECTION, SOLUTION INTRAVENOUS at 09:30

## 2024-06-11 RX ADMIN — SUGAMMADEX 200 MG: 100 INJECTION, SOLUTION INTRAVENOUS at 09:55

## 2024-06-11 RX ADMIN — ACETAMINOPHEN 1000 MG: 500 TABLET ORAL at 07:21

## 2024-06-11 RX ADMIN — FENTANYL CITRATE 100 MCG: 50 INJECTION, SOLUTION INTRAMUSCULAR; INTRAVENOUS at 09:19

## 2024-06-11 RX ADMIN — CEFAZOLIN 2000 MG: 2 INJECTION, POWDER, FOR SOLUTION INTRAMUSCULAR; INTRAVENOUS at 09:15

## 2024-06-11 RX ADMIN — MIDAZOLAM 2 MG: 1 INJECTION INTRAMUSCULAR; INTRAVENOUS at 08:29

## 2024-06-11 RX ADMIN — ROCURONIUM BROMIDE 5 MG: 10 INJECTION, SOLUTION INTRAVENOUS at 09:19

## 2024-06-11 RX ADMIN — LIDOCAINE HYDROCHLORIDE 40 MG: 20 INJECTION, SOLUTION INFILTRATION; PERINEURAL at 09:20

## 2024-06-11 RX ADMIN — Medication 100 MG: at 09:21

## 2024-06-11 RX ADMIN — ROPIVACAINE HYDROCHLORIDE 20 ML: 5 INJECTION, SOLUTION EPIDURAL; INFILTRATION; PERINEURAL at 08:25

## 2024-06-11 RX ADMIN — SODIUM CHLORIDE: 9 INJECTION, SOLUTION INTRAVENOUS at 07:22

## 2024-06-11 RX ADMIN — METHOCARBAMOL 500 MG: 100 INJECTION INTRAMUSCULAR; INTRAVENOUS at 09:36

## 2024-06-11 RX ADMIN — PROPOFOL 200 MG: 10 INJECTION, EMULSION INTRAVENOUS at 09:20

## 2024-06-11 ASSESSMENT — PAIN - FUNCTIONAL ASSESSMENT
PAIN_FUNCTIONAL_ASSESSMENT: 0-10
PAIN_FUNCTIONAL_ASSESSMENT: NONE - DENIES PAIN

## 2024-06-11 ASSESSMENT — ENCOUNTER SYMPTOMS: SHORTNESS OF BREATH: 0

## 2024-06-11 ASSESSMENT — PAIN SCALES - GENERAL
PAINLEVEL_OUTOF10: 0
PAINLEVEL_OUTOF10: 0

## 2024-06-11 NOTE — ANESTHESIA PRE PROCEDURE
Department of Anesthesiology  Preprocedure Note       Name:  Jay Ruelas   Age:  69 y.o.  :  1955                                          MRN:  [id number]         Date:  2024      Surgeon: Surgeon(s):  Edmar Walsh MD    Procedure: Procedure(s):  OPEN RIGHT INGUINAL HERNIA REPAIR WITH MESH-TAP BLOCK    Medications prior to admission:   Prior to Admission medications    Not on File       Current medications:    Current Facility-Administered Medications   Medication Dose Route Frequency Provider Last Rate Last Admin    sodium chloride flush 0.9 % injection 5-40 mL  5-40 mL IntraVENous 2 times per day Don Padron MD        sodium chloride flush 0.9 % injection 5-40 mL  5-40 mL IntraVENous PRN Don Padron MD        0.9 % sodium chloride infusion   IntraVENous PRN Don Padron MD        acetaminophen (TYLENOL) tablet 1,000 mg  1,000 mg Oral Once Don Padron MD        sodium chloride flush 0.9 % injection 5-40 mL  5-40 mL IntraVENous 2 times per day Edmar Walsh MD        sodium chloride flush 0.9 % injection 5-40 mL  5-40 mL IntraVENous PRN Edmar Walsh MD        0.9 % sodium chloride infusion   IntraVENous PRN Edmar Walsh MD        ceFAZolin (ANCEF) 2,000 mg in sterile water 20 mL IV syringe  2,000 mg IntraVENous On Call to OR Edmar Walsh MD           Allergies:  No Known Allergies    Problem List:    Patient Active Problem List   Diagnosis Code    Radial head fracture, closed S52.123A    Sprain of acromioclavicular ligament S43.50XA    Encounter for preoperative assessment Z01.818    Bladder cancer (HCC) C67.9    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

## 2024-06-11 NOTE — ANESTHESIA PROCEDURE NOTES
Peripheral Block    Patient location during procedure: pre-op  Reason for block: post-op pain management and at surgeon's request  Start time: 6/11/2024 8:25 AM  End time: 6/11/2024 8:33 AM  Staffing  Performed: anesthesiologist   Anesthesiologist: Don Padron MD  Performed by: Don Padron MD  Authorized by: Don Padron MD    Preanesthetic Checklist  Completed: patient identified, IV checked, site marked, risks and benefits discussed, surgical/procedural consents, equipment checked, pre-op evaluation, timeout performed, anesthesia consent given, oxygen available, monitors applied/VS acknowledged, fire risk safety assessment completed and verbalized and blood product R/B/A discussed and consented  Peripheral Block   Patient position: supine  Prep: ChloraPrep  Provider prep: mask  Patient monitoring: continuous pulse ox  Block type: TAP  Laterality: right  Injection technique: single-shot  Guidance: ultrasound guided    Needle   Needle type: insulated echogenic nerve stimulator needle   Needle gauge: 21 G  Needle localization: ultrasound guidance  Needle length: 11 cm  Assessment   Injection assessment: negative aspiration for heme, no paresthesia on injection, local visualized surrounding nerve on ultrasound and no intravascular symptoms  Paresthesia pain: none  Slow fractionated injection: yes  Hemodynamics: stable  Outcomes: uncomplicated and patient tolerated procedure well    Medications Administered  ropivacaine (NAROPIN) injection 0.5% - Perineural   20 mL - 6/11/2024 8:25:00 AM

## 2024-06-11 NOTE — BRIEF OP NOTE
[facility]  Brief Operative Note    Pt Name: Jay Ruelas  CSN: [id number]  YOB: 1955    Date of Procedure: 6/11/2024    Pre-operative Diagnosis:  Reducible right inguinal hernia    Post-operative Diagnosis: same     Procedure:  Open right inguinal hernia with mesh    Surgeon(s):  Edmar Walsh MD     Surgical Assistant: Jovany Traore    Anesthesia:  General anesthesia and TAP block    Findings: Full note dictated, Dictation Job Number: [id number]  Infection Present At Time Of Surgery (PATOS) (choose all levels that have infection present):  No infection present    Estimated Blood Loss: less than 50  ml    Complications: None    Specimens: none  * No specimens in log *     Implants:  Implant Name Type Inv. Item Serial No.  Lot No. LRB No. Used Action   MESH LOWELL [id number] INGUINAL POLYPR MFIL RECTANG - [id number]  MESH LOWELL [id number] INGUINAL POLYPR MFIL RECTANG  BARD DAVOL-WD [id number] Right 1 Implanted       Drains: none   Urostomy Ileal conduit RLQ (Active)       Condition: stable     Disposition and Post-operative plan: PACU, home     Edmar Walsh MD, FACS  6/11/2024  9:57 AM

## 2024-06-11 NOTE — OP NOTE
[address] [address]                            OPERATIVE REPORT      PATIENT NAME: STONE IRVING              : 1955  MED REC NO: [id number]                      ROOM: OR  ACCOUNT NO: [id number]                       ADMIT DATE: 2024  PROVIDER: Edmar Walsh MD      DATE OF PROCEDURE:  2024    SURGEON:  Edmar Walsh MD    PREOPERATIVE DIAGNOSIS:  Reducible right inguinal hernia.    POSTOPERATIVE DIAGNOSIS:  Reducible right inguinal hernia.    PROCEDURE:  Open right inguinal hernia repair with mesh.    ANESTHESIA:  General and postoperative TAP block.    INDICATIONS:  This is a 69-year-old male who presents initially with bladder cancer, undergoing a robotic cystectomy with Urology team and found to have a right inguinal hernia.  This would need to be repaired, but that was not the time that mesh would be necessary and due to the robotic approach for the cystectomy, the preperitoneal plane was not viable.  An open right inguinal hernia repair with mesh was the only remaining option.  The risks, benefits, and alternative treatments were discussed at length with the patient.  Details of the procedure were discussed.  All questions were answered.  The patient understood, agreed, and wished to proceed.    DESCRIPTION OF PROCEDURE:  The patient was brought to the operating suite, was placed in the supine position and general anesthesia was induced and well tolerated.  The patient's abdomen was prepped and draped in the usual sterile fashion after appropriate time-out was performed verifying the operative site, the procedure, and the patient.  An oblique incision was made over the patient's right groin.  This was deepened through subcutaneous tissue down to the level of the fascia with cautery.  The external oblique was then nicked and this was elevated with 2 hemostats, taking care not to injure ilioinguinal nerve

## 2024-06-11 NOTE — ANESTHESIA POSTPROCEDURE EVALUATION
Department of Anesthesiology  Postprocedure Note    Patient: Jay Ruelas  MRN: [id number]  YOB: 1955  Date of evaluation: 6/11/2024    Procedure Summary       Date: 06/11/24 Room / Location: [address]    Anesthesia Start: 0915 Anesthesia Stop: 1022    Procedure: OPEN RIGHT INGUINAL HERNIA REPAIR WITH MESH-TAP BLOCK (Right: Groin) Diagnosis:       Reducible right inguinal hernia      (Reducible right inguinal hernia [K40.90])    Surgeons: Edmar Walsh MD Responsible Provider: Don Padron MD    Anesthesia Type: general ASA Status: 2            Anesthesia Type: No value filed.    Sri Phase I: Sri Score: 10    Sri Phase II: Sri Score: 10    Anesthesia Post Evaluation    Patient location during evaluation: PACU  Patient participation: complete - patient participated  Level of consciousness: awake  Airway patency: patent  Nausea & Vomiting: no nausea and no vomiting  Cardiovascular status: hemodynamically stable  Respiratory status: acceptable  Hydration status: stable  Multimodal analgesia pain management approach  Pain management: adequate    No notable events documented.

## 2024-06-11 NOTE — PROGRESS NOTES
Discharge instructions reviewed with patient and brother, paper copy given, new medication discussed, all questions answered, and verbalized understanding.     Patient states he is ready for discharge and getting dressed now.   
Patient discharged home with all belongings via w/c to car with brother. PIV removed.  
[facility], incision CDI closed with glue, ileal conduit draining to bag, denying pain or other needs at this time, call light in reach, family at the bedside.  
Pt VSS. O2 96% on room air. Pt alert and denies pain at this time. Pt seen by anesthesia. Surgical site to right groin closed with surgical glue, CDI, soft to palpation, skin color and temperature appropriate. Pt with ileal conduit with yellow urine. Phase I criteria met, transferring to phase II for DC.   
Pt arrived from OR to PACU O2 93% on room air. Pt alert and denies pain at this time. Surgical site to Right groin closed with surgical glue, CDI, soft to palpation, skin color and temperature appropriate. Will cont to monitor.   
Teaching / education initiated regarding perioperative experience, expectations, and pain management during stay. Patient verbalized understanding.   
leave alone or drive yourself home.  It is strongly suggested someone stay with you the first 24 hrs. Your surgery will be cancelled if you do not have a ride home.   8. A parent/legal guardian must accompany a child scheduled for surgery and plan to stay at the hospital until the child is discharged.  Please do not bring other children with you.   9. Please wear simple, loose fitting clothing to the hospital.  Do not bring valuables (money, credit cards, checkbooks, etc.) Do not wear any makeup (including no eye makeup) or nail polish on your fingers or toes.             10. DO NOT wear any jewelry or piercings on day of surgery.  All body piercing jewelry must be removed.             11. If you have ___dentures, they will be removed before going to the OR; we will provide you a container.  If you wear ___contact lenses or __x_glasses, they will be removed; please bring a case for them.             12. Please see your family doctor/pediatrician for a history & physical and/or concerning medications.  Bring any test results/reports from your physician's office.   PCP_____berg_____________Phone___________H&P Appt. Date________             13 If you  have a Living Will and Durable Power of  for Healthcare, please bring in a copy.             14. Notify your Surgeon if you develop any illness between now and surgery  time, cough, cold, fever, sore throat, nausea, vomiting, etc.  Please notify your surgeon if you experience dizziness, shortness of breath or blurred vision between now & the time of your surgery             15. DO NOT shave your operative site 96 hours prior to surgery. For face & neck surgery, men may use an electric razor 48 hours prior to surgery.             16. Shower the night before or morning of surgery using an antibacterial soap or as you have been instructed.             17. To provide excellent care visitors will be limited to one in the room at any given time.             18.  Please

## 2024-06-11 NOTE — H&P
20.3 (H)  12.4 - 15.4 % Final    Platelets 03/18/2024 334  135 - 450 K/uL Final    MPV 03/18/2024 7.6  5.0 - 10.5 fL Final    Magnesium 03/18/2024 1.60 (L)  1.80 - 2.40 mg/dL Final    POC Glucose 03/18/2024 142 (H)  70 - 99 mg/dl Final    Performed on 03/18/2024 ACCU-CHEK   Final    POC Glucose 03/18/2024 118 (H)  70 - 99 mg/dl Final    Performed on 03/18/2024 ACCU-CHEK   Final    POC Glucose 03/19/2024 118 (H)  70 - 99 mg/dl Final    Performed on 03/19/2024 ACCU-CHEK   Final    Magnesium 03/19/2024 1.80  1.80 - 2.40 mg/dL Final    Sodium 03/19/2024 139  136 - 145 mmol/L Final    Potassium 03/19/2024 3.8  3.5 - 5.1 mmol/L Final    Chloride 03/19/2024 105  99 - 110 mmol/L Final    CO2 03/19/2024 24  21 - 32 mmol/L Final    Anion Gap 03/19/2024 10  3 - 16 Final    Glucose 03/19/2024 115 (H)  70 - 99 mg/dL Final    BUN 03/19/2024 15  7 - 20 mg/dL Final    Creatinine 03/19/2024 1.0  0.8 - 1.3 mg/dL Final    Est, Glom Filt Rate 03/19/2024 >60  >60 Final    Comment: Pediatric calculator link  https://www.kidney.org/professionals/kdoqi/gfr_calculatorped  Effective Oct 3, 2022  These results are not intended for use in patients  <18 years of age.  eGFR results are calculated without  a race factor using the 2021 CKD-EPI equation.  Careful  clinical correlation is recommended, particularly when  comparing to results calculated using previous equations.  The CKD-EPI equation is less accurate in patients with  extremes of muscle mass, extra-renal metabolism of  creatinine, excessive creatinine ingestion, or following  therapy that affects renal tubular secretion.      Calcium 03/19/2024 8.7  8.3 - 10.6 mg/dL Final    POC Glucose 03/19/2024 137 (H)  70 - 99 mg/dl Final    [facility]   [facility]       ASSESSMENT:  Reducible right inguinal hernia     PLAN:  1. We discussed the risks of surgery including bleeding, infection, damage to surrounding structures, hernia recurrence, chronic pain as well as anesthesia

## 2024-07-01 ENCOUNTER — OFFICE VISIT (OUTPATIENT)
Dept: SURGERY | Age: 69
End: 2024-07-01

## 2024-07-01 VITALS — DIASTOLIC BLOOD PRESSURE: 80 MMHG | BODY MASS INDEX: 24.54 KG/M2 | WEIGHT: 171 LBS | SYSTOLIC BLOOD PRESSURE: 136 MMHG

## 2024-07-01 DIAGNOSIS — Z09 SURGERY FOLLOW-UP: Primary | ICD-10-CM

## 2024-07-01 PROCEDURE — 99024 POSTOP FOLLOW-UP VISIT: CPT | Performed by: SURGERY

## 2024-07-01 NOTE — PROGRESS NOTES
in patients with  extremes of muscle mass, extra-renal metabolism of  creatinine, excessive creatinine ingestion, or following  therapy that affects renal tubular secretion.      Calcium 03/19/2024 8.7  8.3 - 10.6 mg/dL Final    POC Glucose 03/19/2024 137 (H)  70 - 99 mg/dl Final    Performed on 03/19/2024 ACCU-CHEK   Final       No results found.    Pathology:  none    Assessment:  open right inguinal hernia repair with mesh on 6/11/24    Plan:  No heavy lifting over 20lbs for 6 weeks total postop  Diet and activity as tolerated otherwise  OK to return to work for light duty  Follow up with general surgery office as needed    Edmar Walsh MD, FACS  7/1/2024  9:11 AM

## 2024-07-01 NOTE — PATIENT INSTRUCTIONS
No heavy lifting over 20lbs for 6 weeks total postop  Diet and activity as tolerated otherwise  OK to return to work for light duty  Follow up with general surgery office as needed

## 2024-09-23 ENCOUNTER — HOSPITAL ENCOUNTER (OUTPATIENT)
Dept: CT IMAGING | Age: 69
Discharge: HOME | End: 2024-09-23
Attending: UROLOGY
Payer: MEDICARE

## 2024-09-23 ENCOUNTER — HOSPITAL ENCOUNTER (OUTPATIENT)
Dept: GENERAL RADIOLOGY | Age: 69
Discharge: HOME | End: 2024-09-23
Attending: UROLOGY
Payer: MEDICARE

## 2024-09-23 ENCOUNTER — HOSPITAL ENCOUNTER (OUTPATIENT)
Age: 69
Discharge: HOME | End: 2024-09-23
Attending: UROLOGY
Payer: MEDICARE

## 2024-09-23 DIAGNOSIS — C67.9 MALIGNANT NEOPLASM OF BLADDER WALL (HCC): ICD-10-CM

## 2024-09-23 DIAGNOSIS — Z85.51 HISTORY OF MALIGNANT NEOPLASM OF BLADDER: ICD-10-CM

## 2024-09-23 PROCEDURE — 71046 X-RAY EXAM CHEST 2 VIEWS: CPT

## 2024-09-23 PROCEDURE — 74176 CT ABD & PELVIS W/O CONTRAST: CPT

## (undated) DEVICE — SUTURE VLOC 90 2/0 VL 6 GS-22 VLOCM2105

## (undated) DEVICE — SUTURE VICRYL + SZ 2-0 L36IN ABSRB UD L36MM CT-1 1/2 CIR VCP945H

## (undated) DEVICE — ELECTRODE PT RET AD L9FT HI MOIST COND ADH HYDRGEL CORDED

## (undated) DEVICE — STERILE POLYISOPRENE POWDER-FREE SURGICAL GLOVES: Brand: PROTEXIS

## (undated) DEVICE — TIP COVER ACCESSORY

## (undated) DEVICE — SYRINGE,TOOMEY,IRRIGATION,70CC,STERILE: Brand: MEDLINE

## (undated) DEVICE — PROTECTOR EYE PT SELF ADH NS OPT GRD LF

## (undated) DEVICE — Device: Brand: SENSURA MIO

## (undated) DEVICE — GOWN,AURORA,NONREINF,RAGLAN,XXL,STERILE: Brand: MEDLINE

## (undated) DEVICE — TOWEL,OR,DSP,ST,BLUE,DLX,10/PK,8PK/CS: Brand: MEDLINE

## (undated) DEVICE — INSUFFLATION NEEDLE TO ESTABLISH PNEUMOPERITONEUM.: Brand: INSUFFLATION NEEDLE

## (undated) DEVICE — WOUND RETRACTOR AND PROTECTOR: Brand: ALEXIS O WOUND PROTECTOR-RETRACTOR

## (undated) DEVICE — STAPLER INT L75MM CUT LN L73MM STPL LN L77MM BLU B FRM 8

## (undated) DEVICE — SEAL

## (undated) DEVICE — SUTURE VICRYL + SZ 3-0 L27IN ABSRB UD L26MM SH 1/2 CIR VCP416H

## (undated) DEVICE — SUTURE VCRL SZ 0 L36IN ABSRB UD CT-1 L36MM 1/2 CIR TAPR PNT VCP946H

## (undated) DEVICE — 1LYRTR 16FR10ML 100%SILI SNAP: Brand: MEDLINE INDUSTRIES, INC.

## (undated) DEVICE — BLADELESS OBTURATOR: Brand: WECK VISTA

## (undated) DEVICE — AIRSEAL 8 MM ACCESS PORT AND LOW PROFILE OBTURATOR WITH BLADELESS OPTICAL TIP, 120 MM LENGTH: Brand: AIRSEAL

## (undated) DEVICE — WET SKIN PREP TRAY: Brand: MEDLINE INDUSTRIES, INC.

## (undated) DEVICE — SUTURE VICRYL SZ 0 L36IN ABSRB UD CT-1 L36MM 1/2 CIR TAPR PNT VCP946H

## (undated) DEVICE — LEGGINGS, PAIR, CLEAR, STERILE: Brand: MEDLINE

## (undated) DEVICE — CATH FOLEY 18FR LF

## (undated) DEVICE — CATHETER F BLLN 5CC 18FR 2 W HYDRGEL COAT LESS TRAUM LUB

## (undated) DEVICE — SUTURE PROL SZ 0 L30IN NONABSORBABLE BLU SH L26MM 1/2 CIR 8834H

## (undated) DEVICE — SYRINGE, LUER LOCK, 10ML: Brand: MEDLINE

## (undated) DEVICE — PENCIL ES L3M BTTN SWCH S STL HEX LOK BLDE ELECTRD HOLSTER

## (undated) DEVICE — SYRINGE IRRIG 60ML SFT PLIABLE BLB EZ TO GRP 1 HND USE W/

## (undated) DEVICE — ARM DRAPE

## (undated) DEVICE — PMI DISPOSABLE PUNCTURE CLOSURE DEVICE / SUTURE GRASPER: Brand: PMI

## (undated) DEVICE — SUTURE VCRL + SZ 0 L18IN ABSRB CLR VCP112G

## (undated) DEVICE — PENROSE DRAIN 12" X 1/4: Brand: CARDINAL HEALTH

## (undated) DEVICE — BLADE CLIPPER GEN PURP NS

## (undated) DEVICE — SUTURE MCRYL + SZ 4-0 L27IN ABSRB UD L19MM PS-2 3/8 CIR MCP426H

## (undated) DEVICE — CATHETER URETH 18FR BLLN 5CC SIL HYDRGEL 2 W F SPEC CARS M

## (undated) DEVICE — NEEDLE,22GX1.5",REG,BEVEL: Brand: MEDLINE

## (undated) DEVICE — MERCY FAIRFIELD TURNOVER KIT: Brand: MEDLINE INDUSTRIES, INC.

## (undated) DEVICE — SUTURE PDS + SZ 1 L96IN ABSRB VLT L65MM TP-1 1/2 CIR PDP880G

## (undated) DEVICE — SUTURE VCRL + SZ 3-0 L18IN ABSRB UD SH 1/2 CIR TAPERCUT NDL VCP864D

## (undated) DEVICE — STAPLER 60: Brand: SUREFORM

## (undated) DEVICE — BLADE ES ELASTOMERIC COAT INSUL DURABLE BEND UPTO 90DEG

## (undated) DEVICE — SOLUTION IRRIG 1000ML 0.9% SOD CHL USP POUR PLAS BTL

## (undated) DEVICE — SUTURE VICRYL + SZ 0 L27IN CT 3 ABSRB VCP329H

## (undated) DEVICE — STAPLER 60 RELOAD BLUE: Brand: SUREFORM

## (undated) DEVICE — REDUCER: Brand: ENDOWRIST

## (undated) DEVICE — GUIDEWIRE ENDOSCP L150CM DIA0.035IN TIP 3CM PTFE NIT

## (undated) DEVICE — Device: Brand: SENSURA MIO CONVEX BRAND LOGO

## (undated) DEVICE — SHEET,DRAPE,53X77,STERILE: Brand: MEDLINE

## (undated) DEVICE — MAJOR SET UP PK

## (undated) DEVICE — TRI-LUMEN FILTERED TUBE SET WITH ACTIVATED CHARCOAL FILTER: Brand: AIRSEAL

## (undated) DEVICE — SHEET, T, LAPAROTOMY, STERILE: Brand: MEDLINE

## (undated) DEVICE — SUTURE VCRL + SZ 4-0  L27IN RB 1  ABSRB VCP214H

## (undated) DEVICE — LIQUIBAND RAPID ADHESIVE 36/CS 0.8ML: Brand: MEDLINE

## (undated) DEVICE — STANDARD HYPODERMIC NEEDLE,POLYPROPYLENE HUB: Brand: MONOJECT

## (undated) DEVICE — SUTURE ABSORBABLE L 18 IN SZ 4-0 NDL L 19 MM POLYGLACTIN 910 36/BX

## (undated) DEVICE — SYRINGE MED 10ML TRNSLUC BRL PLUNG BLK MRK POLYPR CTRL

## (undated) DEVICE — 30978 SEE SHARP - ENHANCED INTRAOPERATIVE LAPAROSCOPE CLEANING & DEFOGGING: Brand: 30978 SEE SHARP - ENHANCED INTRAOPERATIVE LAPAROSCOPE CLEANING & DEFOGGING

## (undated) DEVICE — SUTURE PERMA-HAND SZ 2-0 L30IN NONABSORBABLE BLK L26MM SH K833H

## (undated) DEVICE — ROBOTIC PK

## (undated) DEVICE — TISSUE RETRIEVAL SYSTEM: Brand: INZII RETRIEVAL SYSTEM

## (undated) DEVICE — SUTURE PERMAHAND SZ 3-0 L18IN NONABSORBABLE BLK L26MM SH C013D

## (undated) DEVICE — SUTURE MONOCRYL + SZ 4-0 L27IN ABSRB UD L19MM PS-2 3/8 CIR MCP426H

## (undated) DEVICE — VESSEL SEALER EXTEND: Brand: ENDOWRIST

## (undated) DEVICE — 3M™ STERI-DRAPE™ INCISE DRAPE 1050 (60CM X 45CM): Brand: STERI-DRAPE™

## (undated) DEVICE — STERILE LATEX POWDER FREE SURGICAL GLOVES WITH HYDROGEL COATING: Brand: PROTEXIS

## (undated) DEVICE — AIRSEAL 5 MM ACCESS PORT AND LOW PROFILE OBTURATOR WITH BLADELESS OPTICAL TIP, 120 MM LENGTH: Brand: AIRSEAL

## (undated) DEVICE — APPLICATOR MEDICATED 26 CC SOLUTION HI LT ORNG CHLORAPREP

## (undated) DEVICE — COVER,TABLE,77X90,STERILE: Brand: MEDLINE

## (undated) DEVICE — MEDIUM-LARGE CLIP APPLIER: Brand: ENDOWRIST